# Patient Record
Sex: MALE | Race: WHITE | Employment: UNEMPLOYED | URBAN - METROPOLITAN AREA
[De-identification: names, ages, dates, MRNs, and addresses within clinical notes are randomized per-mention and may not be internally consistent; named-entity substitution may affect disease eponyms.]

---

## 2020-07-02 ENCOUNTER — HOSPITAL ENCOUNTER (OUTPATIENT)
Dept: PHYSICAL THERAPY | Age: 25
Discharge: HOME | End: 2020-07-02
Payer: COMMERCIAL

## 2020-07-02 PROCEDURE — 97016 VASOPNEUMATIC DEVICE THERAPY: CPT

## 2020-07-02 PROCEDURE — 97014 ELECTRIC STIMULATION THERAPY: CPT

## 2020-07-02 PROCEDURE — 97110 THERAPEUTIC EXERCISES: CPT

## 2020-07-02 PROCEDURE — 97161 PT EVAL LOW COMPLEX 20 MIN: CPT
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
## 2020-07-02 NOTE — PROGRESS NOTES
PHYSICAL THERAPY - DAILY TREATMENT NOTE    Patient Name: Emily Osorio        Date: 2020  : 1995   yes Patient  Verified  Visit #:     Insurance: Payor: Verena Bellamyign / Plan: [address] PT / Product Type: Commerical /      In time: 5:04 Out time: 6:03   Total Treatment Time: 59     Medicare/BCBS Time Tracking (below)   Total Timed Codes (min):  na 1:1 Treatment Time:  na     TREATMENT AREA =  Knee pain, left [M25.562]    SUBJECTIVE  Pain Level (on 0 to 10 scale):  4  / 10   Medication Changes/New allergies or changes in medical history, any new surgeries or procedures?    no  If yes, update Summary List   Subjective Functional Status/Changes:  []  No changes reported     See POC          OBJECTIVE  Modalities Rationale:     decrease edema, decrease inflammation, decrease pain and increase muscle contraction/control to improve patient's ability to ambulate  8 min [x] Estim, type/location: [facility] to R VL/VMO, 10:20, 65 pps, 5 sec w/ quad set in long sit                                     []  att     []  unatt     []  w/US     []  w/ice    []  w/heat    min []  Mechanical Traction: type/lbs                   []  pro   []  sup   []  int   []  cont    []  before manual    []  after manual    min []  Ultrasound, settings/location:      min []  Iontophoresis w/ dexamethasone, location:                                               []  take home patch       []  in clinic    min []  Ice     []  Heat    location/position:    10 min [x]  Vasopneumatic Device, press/temp: MP/LT to the L knee in long sit    min []  Other:    [x] Skin assessment post-treatment (if applicable):    [x]  intact    []  redness- no adverse reaction     []redness  adverse reaction:        10 min Therapeutic Exercise:  [x]  See flow sheet   Rationale:      increase ROM, increase strength, improve coordination, improve balance and increase proprioception to improve the patients ability to ambulate     Billed With/As: [x] TE   [] TA   [] Neuro   [] Self Care Patient Education: [x] Review HEP    [] Progressed/Changed HEP based on:   [] positioning   [] body mechanics   [] transfers   [] heat/ice application    [x] other: discussed sleeping w/ brace donned and locked to avoid any tibial rotation, advised nothing under knee when laying/sitting w/ knee extended, brace locked w/ ambulation until adequate quad control, and SLR w/ brace locked in extension     Other Objective/Functional Measures:    See POC     Post Treatment Pain Level (on 0 to 10) scale:   0  / 10     ASSESSMENT  Assessment/Changes in Function:     See POC     []  See Progress Note/Recertification   Patient will continue to benefit from skilled PT services to modify and progress therapeutic interventions, address functional mobility deficits, address ROM deficits, address strength deficits, analyze and address soft tissue restrictions, analyze and cue movement patterns, analyze and modify body mechanics/ergonomics, assess and modify postural abnormalities, address imbalance/dizziness and instruct in home and community integration to attain remaining goals.    Progress toward goals / Updated goals:    See POC     PLAN  []  Upgrade activities as tolerated yes Continue plan of care   []  Discharge due to :    []  Other:      Therapist: Christopher Hay PT    Date: 7/2/2020 Time: 5:54 PM     Future Appointments   Date Time Provider Desmond Shukla   7/7/2020  3:45 PM Julio Cesar Vallecillo PTA [facility]   7/9/2020  2:15 PM Nemesio Duncan DPT [facility]   7/13/2020  3:30 PM Nemesio Duncan DPT [facility]   7/15/2020  4:00 PM Nemesio Duncan DPT [facility]   7/20/2020  3:30 PM Nemesio Duncan DPT [facility]   7/22/2020  3:15 PM Nemesio Duncan DPT [facility]   7/27/2020  3:30 PM Nemesio Duncan DPT [facility]   7/29/2020  3:45 PM Nemesio Duncan DPT [facility]

## 2020-07-07 ENCOUNTER — HOSPITAL ENCOUNTER (OUTPATIENT)
Dept: PHYSICAL THERAPY | Age: 25
Discharge: HOME | End: 2020-07-07
Payer: COMMERCIAL

## 2020-07-07 PROCEDURE — 97140 MANUAL THERAPY 1/> REGIONS: CPT

## 2020-07-07 PROCEDURE — 97014 ELECTRIC STIMULATION THERAPY: CPT

## 2020-07-07 PROCEDURE — 97110 THERAPEUTIC EXERCISES: CPT

## 2020-07-07 NOTE — PROGRESS NOTES
PT DAILY TREATMENT NOTE     Patient Name: Sherice Nair  Date:2020  : 1995  [x]  Patient  Verified  Payor: Tiffanie Manuel / Plan: [place] [employer]  [employer]IT[employer] PT / Product Type: Commerical /    In time:3:50  Out time:4:57  Total Treatment Time (min): 67    Visit #: 2 of     Treatment Area: Knee pain, left [M25.562]    SUBJECTIVE  Pain Level IN: (0-10 scale): 1/10   Pain Level OUT: (0-10 scale) post treatment: 2/10    Any medication changes, allergies to medications, adverse drug reactions, diagnosis change, or new procedure performed?: [x] No    [] Yes (see summary sheet for update)  Subjective functional status/changes:   [] No changes reported  I am doing good, I can't wait to get rid this brace especially at night so I can sleep better       OBJECTIVE    Modalities Rationale:     decrease edema, decrease inflammation, decrease pain and increase tissue extensibility to improve patient's ability to increase strength and improve ambulation   10+10 min [x] Estim, type/location: [place] to the VMO with active quad sets  IFC with CP                                      []  att     [x]  unatt     []  w/US     []  w/ice    []  w/heat    min []  Mechanical Traction: type/lbs                   []  pro   []  sup   []  int   []  cont    []  before manual    []  after manual    min []  Ultrasound, settings/location:      min []  Iontophoresis w/ dexamethasone, location:                                               []  take home patch       []  in clinic    min []  Ice     []  Heat    location/position:     min []  Vasopneumatic Device, press/temp:     min []  Other:    [] Skin assessment post-treatment (if applicable):    []  intact    []  redness- no adverse reaction     []redness  adverse reaction:             37/32 min Therapeutic Exercise:  [x] See flow sheet : first follow up visit since initial evaluation - initiated POC per flow sheet   Added terminal extension hangs with towel roll in supine   5 min NC for warm up on bike    Rationale: increase ROM and increase strength to improve the patients ability to achieve all goals stated below     10 min Manual Therapy: Technique:      [] S/DTM []IASTM []PROM [] Passive Stretching   [] Graston:  [] GT 1  [] GT 2 [] GT 3 [] GT 4 [] GT 4 [] GT 5  [] GT 6  [] Sweep [] Fan [] [place]  [] Brush   []  Swivel []J- Stroke [] Scoop []IFraming     []Manual TPR  [] TDN (see objective; actual needle insertion time not billed)  []Jt manipulation:Gr I [] II []  III [] IV[] V[]  Treatment/Area: In sitting EOB PROM to (L) knee to increase flexion and extension     Rationale:      decrease pain, increase ROM, increase tissue extensibility and decrease trigger points to improve patient's ability to achieve all         With   [] TE   [] TA   [] neuro   [] other: Patient Education: [x] Review HEP    [] Progressed/Changed HEP based on:   [] positioning   [] body mechanics   [] transfers   [] heat/ice application    [] Graston Education: Explained the effects and benefits of Graston Technique therapy including potential for post treatment soreness and bruising. [] Other:           Objective/Functional Measures with Therapist Assessment Noted with Response to Therapy Session:     first follow up visit since initial evaluation -       initiated POC per flow sheet  Patient presents with approx - 5 with terminal extension - added extension hang in supine to achieve full extension   In supine AAROM with heelslides 105 for flexion   Patient was able to achieve an improved quad contraction with NMES  Patient did report some discomfort with mini squats       ASSESSMENT/Changes in Function:     Patient will continue to benefit from skilled PT services to modify and progress therapeutic interventions, address functional mobility deficits, address ROM deficits, address strength deficits and analyze and modify body mechanics/ergonomics to attain remaining goals.      [x]  See Plan of Care  []  See progress note/recertification  []  See Discharge Summary         Progress towards goals / Updated goals: · Short Term Goals: To be accomplished in  4  weeks:  1. Pt will be I and compliant w/ HEP for self management of sx  2. Pt will demo L knee AROM at least 5-0-125 to improve gait mechanics and stair negotiation  3. Pt will perform 2x10 SLR w/o extensor lag to improve R knee stability for ambulation and transfers  · Long Term Goals: To be accomplished in  8  weeks:  1. Pt will maintain dynamic SLS x 30\" w/o LOB to improve LE proprioception for progression to running  2. Pt will perform 2x10 lateral tap downs w/ proper form and good eccentric control to progress to running and plyometrics  3.  Improve FOTO score to >/= 75/100 to indicate improved function    PLAN  [x]  Upgrade activities as tolerated     [x]  Continue plan of care  []  Update interventions per flow sheet       []  Discharge due to:_  []  Other:_      Kathi Olson, [place] 7/7/2020  4:07 PM    Future Appointments   Date Time Provider Desmond Shukla   7/9/2020  2:15 PM Nemesio Duncan, DPT [facility]   7/13/2020  3:30 PM Nemesio Duncan, ASIFT [facility]   7/15/2020  4:00 PM Nemesio Duncan, ASIFT [facility]   7/20/2020  3:30 PM Nemesio Duncan, ASIFT [facility]   7/22/2020  3:15 PM Nemesio Duncan, ASIFT [facility]   7/27/2020  3:30 PM Nemesio Duncan, ASIFT [facility]   7/29/2020  3:45 PM Nemesio Duncan, [facility]

## 2020-07-09 ENCOUNTER — HOSPITAL ENCOUNTER (OUTPATIENT)
Dept: PHYSICAL THERAPY | Age: 25
Discharge: HOME | End: 2020-07-09
Payer: COMMERCIAL

## 2020-07-09 PROCEDURE — 97014 ELECTRIC STIMULATION THERAPY: CPT | Performed by: PHYSICAL THERAPIST

## 2020-07-09 PROCEDURE — 97112 NEUROMUSCULAR REEDUCATION: CPT | Performed by: PHYSICAL THERAPIST

## 2020-07-09 PROCEDURE — 97110 THERAPEUTIC EXERCISES: CPT | Performed by: PHYSICAL THERAPIST

## 2020-07-09 PROCEDURE — 97140 MANUAL THERAPY 1/> REGIONS: CPT | Performed by: PHYSICAL THERAPIST

## 2020-07-09 NOTE — PROGRESS NOTES
PT DAILY TREATMENT NOTE     Patient Name: Que Mendoza  Date:2020  : 1995  [x]  Patient  Verified  Payor: Rachel Mcgregor / [place]: [place] [employer]  CAPITATED PT / Product Type: Commerical /    In time:217   Out time:310  Total Treatment Time (min): 48  Visit #: 3 of     Treatment Area: Knee pain, left [M25.562]    SUBJECTIVE  Pain Level IN: (0-10 scale): 0/10   Pain Level OUT: (0-10 scale) post treatment: 0/10    Any medication changes, allergies to medications, adverse drug reactions, diagnosis change, or new procedure performed?: [x] No    [] Yes (see summary sheet for update)  Subjective functional status/changes:   [x] No changes reported        OBJECTIVE    Modalities Rationale:     decrease edema, decrease inflammation, decrease pain and increase tissue extensibility to improve patient's ability to increase strength and improve ambulation   15 min [x] Estim, type/location: IFC with ice to the L knee                                     []  att     [x]  unatt     []  w/US     [x]  w/ice    []  w/heat    min []  Mechanical Traction: type/lbs                   []  pro   []  sup   []  int   []  cont    []  before manual    []  after manual    min []  Ultrasound, settings/location:      min []  Iontophoresis w/ dexamethasone, location:                                               []  take home patch       []  in clinic    min []  Ice     []  Heat    location/position:     min []  Vasopneumatic Device, press/temp:     min []  Other:    [] Skin assessment post-treatment (if applicable):    []  intact    []  redness- no adverse reaction     []redness  adverse reaction:             18 min Therapeutic Exercise:  [x] See flow sheet :    Rationale: increase ROM and increase strength to improve the patients ability to achieve all goals stated below       10 min Neuromuscular Re-education:  []  See flow sheet : [place] estim 10/10 for 10 min with quad sets   Rationale: increase strength  to improve the patients ability to improve functional transfers      10 min Manual Therapy: Technique:      [] S/DTM []IASTM []PROM [] Passive Stretching   [] Graston:  [] GT 1  [] GT 2 [] GT 3 [] GT 4 [] GT 4 [] GT 5  [] GT 6  [] Sweep [] Fan [] [place]  [] Brush   []  Swivel []J- Stroke [] Scoop []IFraming     []Manual TPR  [] TDN (see objective; actual needle insertion time not billed)  []Jt manipulation:Gr I [] II []  III [] IV[] V[]  Treatment/Area: In sitting EOB PROM to (L) knee to increase flexion and extension     Rationale:      decrease pain, increase ROM, increase tissue extensibility and decrease trigger points to improve patient's ability to achieve all         With   [] TE   [] TA   [] neuro   [] other: Patient Education: [x] Review HEP    [] Progressed/Changed HEP based on:   [] positioning   [] body mechanics   [] transfers   [] heat/ice application    [] Graston Education: Explained the effects and benefits of Graston Technique therapy including potential for post treatment soreness and bruising. [] Other:       Objective/Functional Measures   Pt continues to have quad lag with SLR      ASSESSMENT/Changes in Function:   Pt tolerated all therex without increased pain. Continues to present with L quad weakness noted with lag with SLR. Continue to progress as tolerated. Patient will continue to benefit from skilled PT services to modify and progress therapeutic interventions, address functional mobility deficits, address ROM deficits, address strength deficits and analyze and modify body mechanics/ergonomics to attain remaining goals. Progress towards goals / Updated goals: · Short Term Goals: To be accomplished in  4  weeks:  1. Pt will be I and compliant w/ HEP for self management of sx- pt is compliant with HEP  2. Pt will demo L knee AROM at least 5-0-125 to improve gait mechanics and stair negotiation  3.  Pt will perform 2x10 SLR w/o extensor lag to improve R knee stability for ambulation and transfers  · Long Term Goals: To be accomplished in  8  weeks:  1. Pt will maintain dynamic SLS x 30\" w/o LOB to improve LE proprioception for progression to running  2. Pt will perform 2x10 lateral tap downs w/ proper form and good eccentric control to progress to running and plyometrics  3.  Improve FOTO score to >/= 75/100 to indicate improved function    PLAN  [x]  Upgrade activities as tolerated     [x]  Continue plan of care  []  Update interventions per flow sheet       []  Discharge due to:_  [x]  Other: check goals       Brooks Hinton DPT 7/9/2020  418  PM    Future Appointments   Date Time Provider Desmond Shukla   7/13/2020  3:30 PM Didi Cea, DPT [facility]   7/15/2020  4:00 PM Didi Cea, DPT [facility]   7/20/2020  3:30 PM Didi Cea, DPT [facility]   7/22/2020  3:15 PM Didi Cea, DPT [facility]   7/27/2020  3:30 PM Didi Cea, DPT [facility]   7/29/2020  3:45 PM Didi Cea, DPT [facility]

## 2020-07-13 ENCOUNTER — HOSPITAL ENCOUNTER (OUTPATIENT)
Dept: PHYSICAL THERAPY | Age: 25
Discharge: HOME | End: 2020-07-13
Payer: COMMERCIAL

## 2020-07-13 PROCEDURE — 97112 NEUROMUSCULAR REEDUCATION: CPT | Performed by: PHYSICAL THERAPIST

## 2020-07-13 PROCEDURE — 97140 MANUAL THERAPY 1/> REGIONS: CPT | Performed by: PHYSICAL THERAPIST

## 2020-07-13 PROCEDURE — 97110 THERAPEUTIC EXERCISES: CPT | Performed by: PHYSICAL THERAPIST

## 2020-07-13 PROCEDURE — 97014 ELECTRIC STIMULATION THERAPY: CPT | Performed by: PHYSICAL THERAPIST

## 2020-07-13 NOTE — PROGRESS NOTES
PT DAILY TREATMENT NOTE     Patient Name: Lorrie Gilliland  Date:2020  : 1995  [x]  Patient  Verified  Payor: Agustina Thakur / Plan: [place] [employer]  CAPITATED PT / Product Type: Commerical /    In time:325   Out time:440  Total Treatment Time (min): 75  Visit #: 4  of     Treatment Area: Knee pain, left [M25.562]    SUBJECTIVE  Pain Level IN: (0-10 scale): 0/10   Pain Level OUT: (0-10 scale) post treatment: 0/10    Any medication changes, allergies to medications, adverse drug reactions, diagnosis change, or new procedure performed?: [x] No    [] Yes (see summary sheet for update)  Subjective functional status/changes:   [] No changes reported   Pt reports compliance with wearing his brace. Pt reports compliance with HEP. When my gait improve so my back stops hurting.        OBJECTIVE    Modalities Rationale:     decrease edema, decrease inflammation, decrease pain and increase tissue extensibility to improve patient's ability to increase strength and improve ambulation   15 min [x] Estim, type/location: IFC with ice to the L knee                                     []  att     [x]  unatt     []  w/US     [x]  w/ice    []  w/heat    min []  Mechanical Traction: type/lbs                   []  pro   []  sup   []  int   []  cont    []  before manual    []  after manual    min []  Ultrasound, settings/location:      min []  Iontophoresis w/ dexamethasone, location:                                               []  take home patch       []  in clinic    min []  Ice     []  Heat    location/position:     min []  Vasopneumatic Device, press/temp:     min []  Other:    [] Skin assessment post-treatment (if applicable):    []  intact    []  redness- no adverse reaction     []redness  adverse reaction:             50 min Therapeutic Exercise:  [x] See flow sheet :    Rationale: increase ROM and increase strength to improve the patients ability to achieve all goals stated below       10 min Neuromuscular Re-education:  []  See flow sheet : [place] estim 10/10 for 10 min with quad sets   Rationale: increase strength  to improve the patients ability to improve functional transfers      10 min Manual Therapy: Technique:      [] S/DTM []IASTM []PROM [] Passive Stretching   [] Graston:  [] GT 1  [] GT 2 [] GT 3 [] GT 4 [] GT 4 [] GT 5  [] GT 6  [] Sweep [] Fan [] Jbphh  [] Brush   []  Swivel []J- Stroke [] Scoop []IFraming     []Manual TPR  [] TDN (see objective; actual needle insertion time not billed)  []Jt manipulation:Gr I [] II []  III [] IV[] V[]  Treatment/Area: In sitting EOB PROM to (L) knee to increase flexion and extension     Rationale:      decrease pain, increase ROM, increase tissue extensibility and decrease trigger points to improve patient's ability to achieve all         With   [] TE   [] TA   [] neuro   [] other: Patient Education: [x] Review HEP    [] Progressed/Changed HEP based on:   [] positioning   [] body mechanics   [] transfers   [] heat/ice application    [] Graston Education: Explained the effects and benefits of Graston Technique therapy including potential for post treatment soreness and bruising. [] Other:       Objective/Functional Measures   Pt continues to have quad lag with SLR   A/PROM of the L knee: 0-102/110 deg       ASSESSMENT/Changes in Function:   Continued quad weakness noted with Therex. Improvements in ROM of the L knee this session. Continue to progress as tolerated. Patient will continue to benefit from skilled PT services to modify and progress therapeutic interventions, address functional mobility deficits, address ROM deficits, address strength deficits and analyze and modify body mechanics/ergonomics to attain remaining goals. Progress towards goals / Updated goals: · Short Term Goals: To be accomplished in  4  weeks:  1. Pt will be I and compliant w/ HEP for self management of sx- pt is compliant with HEP 7/13/2020  2.  Pt will demo L knee AROM at least 5-0-125 to improve gait mechanics and stair negotiation progressing 7/13/2020  3. Pt will perform 2x10 SLR w/o extensor lag to improve R knee stability for ambulation and transfers  · Long Term Goals: To be accomplished in  8  weeks:  1. Pt will maintain dynamic SLS x 30\" w/o LOB to improve LE proprioception for progression to running  2. Pt will perform 2x10 lateral tap downs w/ proper form and good eccentric control to progress to running and plyometrics  3.  Improve FOTO score to >/= 75/100 to indicate improved function    PLAN  [x]  Upgrade activities as tolerated     [x]  Continue plan of care  []  Update interventions per flow sheet       []  Discharge due to:_  []  Other:   Vivian Castrejon DPT 7/13/2020  427  PM    Future Appointments   Date Time Provider Desmond Shukla   7/15/2020  4:00 PM Carmen Srivastava DPT [facility]   7/20/2020  3:30 PM Carmen Srivastava DPT [facility]   7/22/2020  3:15 PM Carmen Srivastava DPT [facility]   7/27/2020  3:30 PM Carmen Srivastava DPT [facility]   7/29/2020  3:45 PM Carmen Srivastava DPT [facility]

## 2020-07-15 ENCOUNTER — HOSPITAL ENCOUNTER (OUTPATIENT)
Dept: PHYSICAL THERAPY | Age: 25
Discharge: HOME | End: 2020-07-15
Payer: COMMERCIAL

## 2020-07-15 PROCEDURE — 97140 MANUAL THERAPY 1/> REGIONS: CPT | Performed by: PHYSICAL THERAPIST

## 2020-07-15 PROCEDURE — 97016 VASOPNEUMATIC DEVICE THERAPY: CPT | Performed by: PHYSICAL THERAPIST

## 2020-07-15 PROCEDURE — 97112 NEUROMUSCULAR REEDUCATION: CPT | Performed by: PHYSICAL THERAPIST

## 2020-07-15 PROCEDURE — 97110 THERAPEUTIC EXERCISES: CPT | Performed by: PHYSICAL THERAPIST

## 2020-07-15 NOTE — PROGRESS NOTES
PT DAILY TREATMENT NOTE     Patient Name: Amanda De Leon  Date:7/15/2020  : 1995  [x]  Patient  Verified  Payor: Rafael Benavidez / Plan: [place] [employer]  [facility] Product Type: Commerical /    In time:402   Out time:519  Total Treatment Time (min): 68  Visit #: 5 of     Treatment Area: Knee pain, left [M25.562]    SUBJECTIVE  Pain Level IN: (0-10 scale): 0/10   Pain Level OUT: (0-10 scale) post treatment: 0/10    Any medication changes, allergies to medications, adverse drug reactions, diagnosis change, or new procedure performed?: [x] No    [] Yes (see summary sheet for update)  Subjective functional status/changes:   [x] No changes reported        OBJECTIVE    Modalities Rationale:     decrease edema, decrease inflammation, decrease pain and increase tissue extensibility to improve patient's ability to increase strength and improve ambulation   H min [x] Estim, type/location: IFC with ice to the L knee                                     []  att     [x]  unatt     []  w/US     [x]  w/ice    []  w/heat    min []  Mechanical Traction: type/lbs                   []  pro   []  sup   []  int   []  cont    []  before manual    []  after manual    min []  Ultrasound, settings/location:      min []  Iontophoresis w/ dexamethasone, location:                                               []  take home patch       []  in clinic    min []  Ice     []  Heat    location/position:    15 min [x]  Vasopneumatic Device, press/temp: MP/LT    min []  Other:    [] Skin assessment post-treatment (if applicable):    []  intact    []  redness- no adverse reaction     []redness  adverse reaction:             42 min Therapeutic Exercise:  [x] See flow sheet :    Rationale: increase ROM and increase strength to improve the patients ability to achieve all goals stated below       10 min Neuromuscular Re-education:  []  See flow sheet : [place] estim 5/5 for 10 min with quad sets   Rationale: increase strength  to improve the patients ability to improve functional transfers      10 min Manual Therapy: Technique:      [] S/DTM []IASTM [x]PROM [] Passive Stretching   [] Graston:  [] GT 1  [] GT 2 [] GT 3 [] GT 4 [] GT 4 [] GT 5  [] GT 6  [] Sweep [] Fan [] Whiting  [] Brush   []  Swivel []J- Stroke [] Scoop []IFraming     []Manual TPR  [] TDN (see objective; actual needle insertion time not billed)  []Jt manipulation:Gr I [] II []  III [] IV[] V[]  Treatment/Area:  L knee    Rationale:      decrease pain, increase ROM, increase tissue extensibility and decrease trigger points to improve patient's ability to achieve all         With   [] TE   [] TA   [] neuro   [] other: Patient Education: [x] Review HEP    [] Progressed/Changed HEP based on:   [] positioning   [] body mechanics   [] transfers   [] heat/ice application    [] Graston Education: Explained the effects and benefits of Graston Technique therapy including potential for post treatment soreness and bruising. [] Other:       Objective/Functional Measures         ASSESSMENT/Changes in Function:   Improved tolerance to therex with reduced quivering with quad strengthening. Continue to progress as tolerated per MD protocol. Patient will continue to benefit from skilled PT services to modify and progress therapeutic interventions, address functional mobility deficits, address ROM deficits, address strength deficits and analyze and modify body mechanics/ergonomics to attain remaining goals. Progress towards goals / Updated goals: · Short Term Goals: To be accomplished in  4  weeks:  1. Pt will be I and compliant w/ HEP for self management of sx- pt is compliant with HEP 7/13/2020  2. Pt will demo L knee AROM at least 5-0-125 to improve gait mechanics and stair negotiation progressing 7/13/2020  3. Pt will perform 2x10 SLR w/o extensor lag to improve R knee stability for ambulation and transfers  · Long Term Goals: To be accomplished in  8  weeks:  1.  Pt will maintain dynamic SLS x 30\" w/o LOB to improve LE proprioception for progression to running  2. Pt will perform 2x10 lateral tap downs w/ proper form and good eccentric control to progress to running and plyometrics  3.  Improve FOTO score to >/= 75/100 to indicate improved function    PLAN  [x]  Upgrade activities as tolerated     [x]  Continue plan of care  []  Update interventions per flow sheet       []  Discharge due to:_  [x]  Other: check goals   Lorena Richardson DPT 7/15/2020  510   PM    Future Appointments   Date Time Provider Desmond Shukla   7/20/2020  3:30 PM Deepa Trent, [facility]   7/22/2020  3:15 PM Deepa Trent, [facility]   7/27/2020  3:30 PM Deepa Trent, [facility]   7/29/2020  3:45 PM Deepa Trent, [facility]   8/3/2020  3:45 PM Deepa Trent, [facility]   8/5/2020  1:45 PM Deepa Trent, [facility]   8/10/2020  3:45 PM Deepa Trent, [facility]   8/12/2020  2:30 PM Deepa Trent, [facility]

## 2020-07-20 ENCOUNTER — HOSPITAL ENCOUNTER (OUTPATIENT)
Dept: PHYSICAL THERAPY | Age: 25
Discharge: HOME | End: 2020-07-20
Payer: COMMERCIAL

## 2020-07-20 PROCEDURE — 97112 NEUROMUSCULAR REEDUCATION: CPT | Performed by: PHYSICAL THERAPIST

## 2020-07-20 PROCEDURE — 97110 THERAPEUTIC EXERCISES: CPT | Performed by: PHYSICAL THERAPIST

## 2020-07-20 PROCEDURE — 97016 VASOPNEUMATIC DEVICE THERAPY: CPT | Performed by: PHYSICAL THERAPIST

## 2020-07-20 NOTE — PROGRESS NOTES
PT DAILY TREATMENT NOTE     Patient Name: Abran Mckeon  Date:2020  : 1995  [x]  Patient  Verified  Payor: Jose Manuel Acosta / Plan: [place] [employer]  CAPITATED PT / Product Type: Commerical /    In time:3:30  Out time:4:40  Total Treatment Time (min): 70  Visit #: 6 of 16    Treatment Area: Knee pain, left [M25.562]    SUBJECTIVE  Pain Level (0-10 scale): 0/10  Any medication changes, allergies to medications, adverse drug reactions, diagnosis change, or new procedure performed?: [x] No    [] Yes (see summary sheet for update)  Subjective functional status/changes:   [] No changes reported  Had an increase in pain when going back to work but is getting used to it. OBJECTIVE    Modality rationale: decrease pain, increase tissue extensibility and increase muscle contraction/control to improve the patients ability to ambulate and complete ADL's without pain.     Min Type Additional Details    [] Estim:  []Unatt       []IFC  []Premod                        []Other:  []w/ice   []w/heat  Position:  Location:    [] Estim: []Att    []TENS instruct  []NMES                    []Other:  []w/US   []w/ice   []w/heat  Position:  Location:    []  Traction: [] Cervical       []Lumbar                       [] Prone          []Supine                       []Intermittent   []Continuous Lbs:  [] before manual  [] after manual    []  Ultrasound: []Continuous   [] Pulsed                           []1MHz   []3MHz W/cm2:  Location:    []  Iontophoresis with dexamethasone         Location: [] Take home patch   [] In clinic    []  Ice     []  heat  []  Ice massage  []  Laser   []  Anodyne Position:  Location:    []  Laser with stim  []  Other:  Position:  Location:   15 [x]  Vasopneumatic Device Pressure:       [] lo [x] med [] hi   Temperature: [x] lo [] med [] hi   [] Skin assessment post-treatment:  []intact []redness- no adverse reaction    []redness  adverse reaction:     25 min Therapeutic Exercise:  [x] See flow sheet : Rationale: increase strength to improve the patients ability to ambulate and negotiate stairs. 30 min Neuromuscular Re-education:  [x]  See flow sheet :[place] stim with quad co contractions 5:5 to L quad, SLS weight shifts, Rhomberg Progression on the floor/foam L EO In/Bun/GT/Tandem, Rebounder SLS on L with foam   Rationale: increase strength and improve coordination  to improve the patients ability to properly contract L quad and knee stabilizers. With   [] TE   [] TA   [] neuro   [] other: Patient Education: [x] Review HEP    [] Progressed/Changed HEP based on:   [] positioning   [] body mechanics   [] transfers   [] heat/ice application    [] Graston Education: Explained the effects and benefits of Graston Technique therapy including potential for post treatment soreness and bruising. [] Other:      Other Objective/Functional Measures:   L Knee AROM: Flex (108), Ext (-5)   L Knee PROM: Flex (110), Ext (0) achieved after hanging extension  Decreased L quad lag during supine SLR  Balanced in tandem 30\" w/o UE assist on floor and foam L foot  Balanced in SLS 30\" w/o UE assist on floor and foam L foot    Pain Level (0-10 scale) post treatment: 0/10    ASSESSMENT/Changes in Function:   Patient tolerated progression of balance exercises noted in the exercises on the flow sheet and should be progressed upon next visit. Patients L knee ROM is improving alongside treatment. Observed decrease in L Quad lag during Flexion SLR. Continue POC as tolerated per MD protocol. Patient will continue to benefit from skilled PT services to address functional mobility deficits, address strength deficits and analyze and cue movement patterns to attain remaining goals. [x]  See Plan of Care  []  See progress note/recertification  []  See Discharge Summary         Progress towards goals / Updated goals: · Short Term Goals: To be accomplished in  4  weeks:  1.  Pt will be I and compliant w/ HEP for self management of sx- pt is compliant with HEP 7/13/2020  2. Pt will demo L knee AROM at least 5-0-125 to improve gait mechanics and stair negotiation - progressing (0-108 deg) 7/20/2020  3. Pt will perform 2x10 SLR w/o extensor lag to improve R knee stability for ambulation and transfers. Progressing (1x10) SLR with decreased lag 7/20/2020  · Long Term Goals: To be accomplished in  8  weeks:  1. Pt will maintain dynamic SLS x 30\" w/o LOB to improve LE proprioception for progression to running. Progressing Added Ball Toss Rebounder  7/20/2020  2. Pt will perform 2x10 lateral tap downs w/ proper form and good eccentric control to progress to running and plyometrics  3.  Improve FOTO score to >/= 75/100 to indicate improved function      PLAN  [x]  Upgrade activities as tolerated     [x]  Continue plan of care  []  Update interventions per flow sheet       []  Discharge due to:_  []  Other:_     Sandrita Watkins 7/20/2020  3:35 PM    Future Appointments   Date Time Provider Desmond Shukla   7/22/2020  3:15 PM Richardine Saltness, DPT [facility]   7/27/2020  3:30 PM Richardine Saltness, DPT [facility]   7/29/2020  3:45 PM Richardine Saltness, DPT [facility]   8/3/2020  3:45 PM Richardine Saltness, DPT [facility]   8/5/2020  1:45 PM Richardine Saltness, DPT [facility]   8/10/2020  3:45 PM Richardine Saltness, DPT [facility]   8/12/2020  2:30 PM Richardine Saltness, DPT [facility]

## 2020-07-22 ENCOUNTER — HOSPITAL ENCOUNTER (OUTPATIENT)
Dept: PHYSICAL THERAPY | Age: 25
Discharge: HOME | End: 2020-07-22
Payer: COMMERCIAL

## 2020-07-22 PROCEDURE — 97110 THERAPEUTIC EXERCISES: CPT | Performed by: PHYSICAL THERAPIST

## 2020-07-22 PROCEDURE — 97112 NEUROMUSCULAR REEDUCATION: CPT | Performed by: PHYSICAL THERAPIST

## 2020-07-22 PROCEDURE — 97016 VASOPNEUMATIC DEVICE THERAPY: CPT | Performed by: PHYSICAL THERAPIST

## 2020-07-22 NOTE — PROGRESS NOTES
PT DAILY TREATMENT NOTE     Patient Name: Abran Mckeon  Date:2020  : 1995  [x]  Patient  Verified  Payor: Jose Manuel Acosta / Plan: [place] [employer]  CAPITATED PT / Product Type: Commerical /    In time: 3:15  Out time:4:34  Total Treatment Time (min): 79  Visit #: 7 of -12    Treatment Area: Knee pain, left [M25.562]    SUBJECTIVE  Pain Level (0-10 scale): 0/10  Any medication changes, allergies to medications, adverse drug reactions, diagnosis change, or new procedure performed?: [x] No    [] Yes (see summary sheet for update)  Subjective functional status/changes:   [] No changes reported  \"Everything is feeling good, I just want to get out of this brace already. Haven't had any pain since last visit. \"    OBJECTIVE    Modality rationale: decrease inflammation, decrease pain and increase muscle contraction/control to improve the patients ability to contract L quadriceps and ambulate with less pain.    Min Type Additional Details    - Estim:  -Unatt       -IFC  -Premod                        -Other:  -w/ice   -w/heat  Position:  Location:    - Estim: -Att    -TENS instruct  -NMES                    -Other:  -w/US   -w/ice   -w/heat  Position:  Location:    -  Traction: - Cervical       -Lumbar                       - Prone          -Supine                       -Intermittent   -Continuous Lbs:  - before manual  - after manual    -  Ultrasound: -Continuous   - Pulsed                           -1MHz   -3MHz W/cm2:  Location:    -  Iontophoresis with dexamethasone         Location: - Take home patch   - In clinic    -  Ice     -  heat  -  Ice massage  -  Laser   -  Anodyne Position:  Location:    -  Laser with stim  -  Other:  Position:  Location:   15 -  Vasopneumatic Device Pressure:       - lo * med - hi   Temperature: * lo - med - hi       50 min Therapeutic Exercise:  [x] See flow sheet : Progressed PRE's per flow sheet   Rationale: increase strength and improve coordination to improve the patients ability to complete ADL's and recreational activities. 10 min Neuromuscular Re-education:  [x]  See flow sheet : [place] Stim at 10/10 w/ quad set. Rationale: increase strength and improve coordination  to improve the patients ability to contract L quadriceps for navigating stairs and resuming hobbies/job. 4 min Manual Therapy: Technique:      [] S/DTM []IASTM []PROM [] Passive Stretching   [] Graston:  [] GT 1  [] GT 2 [] GT 3 [] GT 4 [] GT 4 [] GT 5  [] GT 6  [] Sweep [] Fan [] Arvin  [] Brush   []  Swivel []J- Stroke [] Scoop []IFraming     []Manual TPR  [] TDN (see objective; actual needle insertion time not billed)  [x]Jt manipulation:Gr I [] II []  III [x] IV[x] V[]  Treatment/Area:  L Patellar mobilizations in all directions. Rationale:      decrease pain, increase ROM, increase tissue extensibility and decrease trigger points to improve patient's ability to utilize L quad without pain. With   [] TE   [] TA   [] neuro   [] other: Patient Education: [x] Review HEP    [] Progressed/Changed HEP based on:   [] positioning   [] body mechanics   [] transfers   [] heat/ice application    [] Graston Education: Explained the effects and benefits of Graston Technique therapy including potential for post treatment soreness and bruising. [] Other:      Other Objective/Functional Measures:   R AROM: Flex: 115, Ext: 5 After Manual therapy  R PROM: Flex: 115, Ext: 8 After Manual therapy  R patellar hypomobility in all directions. Patient completed TE without fail or complaint of pain. Patient exhibited quad lag and quivering during supine anterior SLR w/ brace. Pain Level (0-10 scale) post treatment: 0/10     ASSESSMENT/Changes in Function:   Patient presented without complaint of pain and completed new therapeutic exercises without issue. Patient showed improved mobility in L knee in comparison to previous visits. Patient still showed quad lag/quivering during anterior SLR and quiver.  Difficulty of exercises should be progressed (ie, resistance and weights). Continue with current POC in accordance to MD Protocol. Patient will continue to benefit from skilled PT services to modify and progress therapeutic interventions, address ROM deficits and address strength deficits to attain remaining goals. [x]  See Plan of Care  []  See progress note/recertification  []  See Discharge Summary         Progress towards goals / Updated goals: · Short Term Goals: To be accomplished in  4  weeks:  1. Pt will be I and compliant w/ HEP for self management of sx- pt is compliant with HEP 7/13/2020  2. Pt will demo L knee AROM at least 5-0-125 to improve gait mechanics and stair negotiation - progressing (0-108 deg) 7/20/2020  3. Pt will perform 2x10 SLR w/o extensor lag to improve R knee stability for ambulation and transfers. Progressing (1x10) SLR with decreased lag 7/20/2020  · Long Term Goals: To be accomplished in  8  weeks:  1. Pt will maintain dynamic SLS x 30\" w/o LOB to improve LE proprioception for progression to running. Progressing Added Ball Toss Rebounder  7/20/2020  2. Pt will perform 2x10 lateral tap downs w/ proper form and good eccentric control to progress to running and plyometrics  3.  Improve FOTO score to >/= 75/100 to indicate improved function    PLAN  [x]  Upgrade activities as tolerated     [x]  Continue plan of care  []  Update interventions per flow sheet       []  Discharge due to:_  [x]  Other: check Goals       Perez [place] SPT 7/22/2020  5:53 PM    Future Appointments   Date Time Provider Desmond Shukla   7/27/2020  3:30 PM Natalie Cannon DPT [facility]   7/29/2020  3:45 PM Natalie Cannon DPT [facility]   8/3/2020  3:45 PM Natalie Cannon DPT [facility]   8/5/2020  2:00 PM [address] [facility]   8/10/2020  3:45 PM Natalie Cannon DPT [facility]   8/12/2020  2:30 PM Natalie Cannon DPT [facility]

## 2020-07-27 ENCOUNTER — HOSPITAL ENCOUNTER (OUTPATIENT)
Dept: PHYSICAL THERAPY | Age: 25
Discharge: HOME | End: 2020-07-27
Payer: COMMERCIAL

## 2020-07-27 PROCEDURE — 97140 MANUAL THERAPY 1/> REGIONS: CPT | Performed by: PHYSICAL THERAPIST

## 2020-07-27 PROCEDURE — 97112 NEUROMUSCULAR REEDUCATION: CPT | Performed by: PHYSICAL THERAPIST

## 2020-07-27 PROCEDURE — 97016 VASOPNEUMATIC DEVICE THERAPY: CPT | Performed by: PHYSICAL THERAPIST

## 2020-07-27 PROCEDURE — 97110 THERAPEUTIC EXERCISES: CPT | Performed by: PHYSICAL THERAPIST

## 2020-07-27 NOTE — PROGRESS NOTES
PT DAILY TREATMENT NOTE     Patient Name: Josephina Ahumada  Date:2020  : 1995  [x]  Patient  Verified  Payor: Marissa Pretty / Plan: [address] [place] [address] Rd PT / Product Type: Commerical /    In time:3:20  Out time: 4:30  Total Treatment Time (min): 70  Visit #: 8 of     Treatment Area: Knee pain, left [M25.562]    SUBJECTIVE  Pain Level (0-10 scale): 0/10  Any medication changes, allergies to medications, adverse drug reactions, diagnosis change, or new procedure performed?: [x] No    [] Yes (see summary sheet for update)  Subjective functional status/changes:   [] No changes reported  I went to the doctor. He dc'd my brace but I can wear this one I had to help the stability until it doesn't hurt anymore. I want to stop wearing it asap though. OBJECTIVE    Modality rationale: decrease inflammation and decrease pain to improve the patients ability to walking without pain.    Min Type Additional Details    [] Estim:  []Unatt       []IFC  []Premod                        []Other:  []w/ice   []w/heat  Position:  Location:    [] Estim: []Att    []TENS instruct  []NMES                    []Other:  []w/US   []w/ice   []w/heat  Position:  Location:    []  Traction: [] Cervical       []Lumbar                       [] Prone          []Supine                       []Intermittent   []Continuous Lbs:  [] before manual  [] after manual    []  Ultrasound: []Continuous   [] Pulsed                           []1MHz   []3MHz W/cm2:  Location:    []  Iontophoresis with dexamethasone         Location: [] Take home patch   [] In clinic    []  Ice     []  heat  []  Ice massage  []  Laser   []  Anodyne Position:  Location:    []  Laser with stim  []  Other:  Position:  Location:   15 [x]  Vasopneumatic Device Pressure:       [] lo [x] med [] hi   Temperature: [x] lo [] med [] hi   [] Skin assessment post-treatment:  []intact []redness- no adverse reaction    []redness  adverse reaction:     32 min Therapeutic Exercise:  [] See flow sheet :   Rationale: increase strength to improve the patients ability to ambulate and complete ADL's. 15 min Neuromuscular Re-education:  []  See flow sheet : 10min 10/10 russian stim on L quad with quad set, supine SLR 4 way with 2# weight verbal cues to contract quads, quad sets. Rationale: increase strength and improve coordination  to improve the patients ability to use proper gait mechanics and negotiate stairs without pain. 8 min Manual Therapy: Technique:      [x] S/DTM []IASTM [x]PROM [x] Passive Stretching   [] Graston:  [] GT 1  [] GT 2 [] GT 3 [] GT 4 [] GT 4 [] GT 5  [] GT 6  [] Sweep [] Fan [] [place]  [] Brush   []  Swivel []J- Stroke [] Scoop []IFraming     []Manual TPR  [] TDN (see objective; actual needle insertion time not billed)  [x]Jt manipulation:Gr I [] II []  III [x] IV[x] V[]  Treatment/Area:  L patellar mobilizations in all directions, L calf stretching and STM, left ITB, Quad, and Anterior Tibialis STM   Rationale:      decrease pain, increase ROM, increase tissue extensibility and decrease trigger points to improve patient's ability to walking without pain. With   [] TE   [] TA   [] neuro   [] other: Patient Education: [x] Review HEP    [] Progressed/Changed HEP based on:   [] positioning   [] body mechanics   [] transfers   [] heat/ice application    [] Graston Education: Explained the effects and benefits of Graston Technique therapy including potential for post treatment soreness and bruising. [] Other:      Other Objective/Functional Measures:   L Knee AROM Flex (115deg), Ext (5deg) before manual  Palpable L calf stiffness treated with STM   Progressed step ups to 8\" step fwd/lat. Pain Level (0-10 scale) post treatment: 0/10    ASSESSMENT/Changes in Function:   Patient came in without ROM brace this visit. He tolerated exercises well without complaint of pain. Progressed patient to 8\" step indicating improved L quad strength and activation. Patient had stiffness of L knee limiting ROM treated with PROM of L knee and STM to L calf,ITB,Quad, and anterior Tibialis. Continue with POC per MD protocol. Patient will continue to benefit from skilled PT services to address functional mobility deficits, address ROM deficits and address strength deficits to attain remaining goals. Progress towards goals / Updated goals: · Short Term Goals: To be accomplished in  4  weeks:  1. Pt will be I and compliant w/ HEP for self management of sx- pt is compliant with HEP 7/13/2020  2. Pt will demo L knee AROM at least 5-0-125 to improve gait mechanics and stair negotiation - progressing (5-0-115 deg) 7/27/2020  3. Pt will perform 2x10 SLR w/o extensor lag to improve R knee stability for ambulation and transfers. Progressing (1x10) SLR with decreased lag 7/20/2020  · Long Term Goals: To be accomplished in  8  weeks:  1. Pt will maintain dynamic SLS x 30\" w/o LOB to improve LE proprioception for progression to running. Progressing Added Ball Toss Rebounder  7/20/2020  2. Pt will perform 2x10 lateral tap downs w/ proper form and good eccentric control to progress to running and plyometrics  3.  Improve FOTO score to >/= 75/100 to indicate improved function    PLAN  [x]  Upgrade activities as tolerated     [x]  Continue plan of care  []  Update interventions per flow sheet       []  Discharge due to:_  [x]  Other: check goals  Arian Gambino SPT 7/27/2020  3:49 PM    Future Appointments   Date Time Provider Desmond Shukla   7/31/2020 10:00 AM Heather Mojica DPT [facility]   8/3/2020  3:45 PM Heather Mojica DPT [facility]   8/5/2020  2:00 PM [address] [facility]   8/10/2020  3:45 PM Heather Mojica DPT [facility]   8/12/2020  2:30 PM Heather Mojica DPT [facility]

## 2020-07-29 ENCOUNTER — APPOINTMENT (OUTPATIENT)
Dept: PHYSICAL THERAPY | Age: 25
End: 2020-07-29
Payer: COMMERCIAL

## 2020-07-31 ENCOUNTER — HOSPITAL ENCOUNTER (OUTPATIENT)
Dept: PHYSICAL THERAPY | Age: 25
Discharge: HOME | End: 2020-07-31
Payer: COMMERCIAL

## 2020-07-31 PROCEDURE — 97016 VASOPNEUMATIC DEVICE THERAPY: CPT | Performed by: PHYSICAL THERAPIST

## 2020-07-31 PROCEDURE — 97110 THERAPEUTIC EXERCISES: CPT | Performed by: PHYSICAL THERAPIST

## 2020-07-31 PROCEDURE — 97140 MANUAL THERAPY 1/> REGIONS: CPT | Performed by: PHYSICAL THERAPIST

## 2020-07-31 NOTE — PROGRESS NOTES
PT DAILY TREATMENT NOTE     Patient Name: Saira Sheriff  Date:2020  : 1995  [x]  Patient  Verified  Payor: Cash Vasquez / Plan: [facility] [employer]  CAPITATED PT / Product Type: Commerical /    In time: 10:01  Out time:11:16  Total Treatment Time (min): 75  Visit #: 9 of     Treatment Area: Knee pain, left [M25.562]    SUBJECTIVE  Pain Level (0-10 scale):  0/10  Any medication changes, allergies to medications, adverse drug reactions, diagnosis change, or new procedure performed?: [x] No    [] Yes (see summary sheet for update)  Subjective functional status/changes:   [] No changes reported  My L knee is feeling fine. My dog slept on it last night so it was extended for a long time but it doesn't hurt. OBJECTIVE    Modality rationale: decrease pain to improve the patients ability to weight bear on L LE with less pain.    Min Type Additional Details    [] Estim:  []Unatt       []IFC  []Premod                        []Other:  []w/ice   []w/heat  Position:  Location:    [] Estim: []Att    []TENS instruct  []NMES                    []Other:  []w/US   []w/ice   []w/heat  Position:  Location:    []  Traction: [] Cervical       []Lumbar                       [] Prone          []Supine                       []Intermittent   []Continuous Lbs:  [] before manual  [] after manual    []  Ultrasound: []Continuous   [] Pulsed                           []1MHz   []3MHz W/cm2:  Location:    []  Iontophoresis with dexamethasone         Location: [] Take home patch   [] In clinic    []  Ice     []  heat  []  Ice massage  []  Laser   []  Anodyne Position:  Location:    []  Laser with stim  []  Other:  Position:  Location:   15 [x]  Vasopneumatic Device Pressure:       [] lo [x] med [] hi   Temperature: [x] lo [] med [] hi   [] Skin assessment post-treatment:  []intact []redness- no adverse reaction    []redness  adverse reaction:       48 min Therapeutic Exercise:  [] See flow sheet : updated HEP   Rationale: increase ROM and increase strength to improve the patients ability to use L LE with less pain. 12 min Manual Therapy: Technique:      [x] S/DTM []IASTM [x]PROM [x] Passive Stretching   [] Graston:  [] GT 1  [] GT 2 [] GT 3 [] GT 4 [] GT 4 [] GT 5  [] GT 6  [] Sweep [] Fan [] [place]  [] Brush   []  Swivel []J- Stroke [] Scoop []IFraming     []Manual TPR  [] TDN (see objective; actual needle insertion time not billed)  []Jt manipulation:Gr I [] II []  III [] IV[] V[]  Treatment/Area:  STM of L quad,hamstrings, adductors, IT band. Passive stretching of L quad. PROM L knee flexion/extension. Rationale:      decrease pain, increase ROM, increase tissue extensibility and decrease trigger points to improve patient's ability to move through available L knee ROM with less pain. With   [x] TE   [] TA   [] neuro   [] other: Patient Education: [x] Review HEP    [] Progressed/Changed HEP based on:   [] positioning   [] body mechanics   [] transfers   [] heat/ice application    [] Graston Education: Explained the effects and benefits of Graston Technique therapy including potential for post treatment soreness and bruising. [] Other:      Other Objective/Functional Measures:     Measurements taken this visit:     L knee PROM: Flexion (123 deg) Extension (3 deg) (after manual)  L knee AROM: Flexion (110 deg) Extension (1 deg) (after manual)    Patient was able to complete 5 consecutive SLR flexion without quad lag    Pain Level (0-10 scale) post treatment: 0/10    ASSESSMENT/Changes in Function:   Patient presented without L knee brace/sleeve. Patient L knee PROM was improved from previous visits ( Flexion was 115 and is now 123 after manual, Extension was -5 and is now 3 after manual). Noted improvement in L quad contraction/strength demonstrated by patients ability to complete 5 consecutive SLR's without quad lag. Held [place] estim to assess L quad strength/activation. Continue PT POC per md protocol.      Patient will continue to benefit from skilled PT services to modify and progress therapeutic interventions, address functional mobility deficits, address ROM deficits, address strength deficits and analyze and address soft tissue restrictions to attain remaining goals. Progress towards goals / Updated goals: · Short Term Goals: To be accomplished in  4  weeks:  1. Pt will be I and compliant w/ HEP for self management of sx- pt is compliant with HEP 7/13/2020  2. Pt will demo L knee AROM at least 5-0-125 to improve gait mechanics and stair negotiation - progressing (0-108 deg) 7/20/2020  3. Pt will perform 2x10 SLR w/o extensor lag to improve R knee stability for ambulation and transfers. Progressing (1x10) SLR with decreased lag 7/20/2020  · Long Term Goals: To be accomplished in  8  weeks:  1. Pt will maintain dynamic SLS x 30\" w/o LOB to improve LE proprioception for progression to running. Progressing Added Ball Toss Rebounder  7/20/2020  2. Pt will perform 2x10 lateral tap downs w/ proper form and good eccentric control to progress to running and plyometrics  3.  Improve FOTO score to >/= 75/100 to indicate improved function    PLAN  [x]  Upgrade activities as tolerated     [x]  Continue plan of care  []  Update interventions per flow sheet       []  Discharge due to:_  [x]  Other: check goals    Henry Began SPT 7/31/2020  11:02 AM    Future Appointments   Date Time Provider Desmond Shukla   8/3/2020  3:45 PM Margo Roller, DPT [facility]   8/5/2020  2:00 PM [address] [place] [address] [facility]   8/10/2020  3:45 PM Margo Roller, DPT [facility]   8/12/2020  2:30 PM [facility] [place] [facility]   8/17/2020  3:00 PM Tessie Giraldo PT [facility]   8/19/2020  3:00 PM [facility] [place] [facility]   8/24/2020  3:45 PM Margo Roller, DPT [facility]   8/26/2020  3:15 PM Margo Roller, DPT [facility]   8/31/2020  3:00 PM Margo Roller, DPT [facility]

## 2020-08-03 ENCOUNTER — HOSPITAL ENCOUNTER (OUTPATIENT)
Dept: PHYSICAL THERAPY | Age: 25
Discharge: HOME | End: 2020-08-03
Payer: COMMERCIAL

## 2020-08-03 PROCEDURE — 97110 THERAPEUTIC EXERCISES: CPT | Performed by: PHYSICAL THERAPIST

## 2020-08-03 PROCEDURE — 97016 VASOPNEUMATIC DEVICE THERAPY: CPT | Performed by: PHYSICAL THERAPIST

## 2020-08-03 PROCEDURE — 97112 NEUROMUSCULAR REEDUCATION: CPT | Performed by: PHYSICAL THERAPIST

## 2020-08-03 NOTE — PROGRESS NOTES
[address] PHYSICAL THERAPY AT [facility] [address] [facility] [address] [facility] [address], [place], [place]  Phone [phone]  Fax [phone]  PROGRESS NOTE  Patient Name: Antionette Maki : 1995   Treatment/Medical Diagnosis: Knee pain, left [M25.562]   Referral Source: Alec Hitchcock MD     Date of Initial Visit: 2020 Attended Visits: 10 Missed Visits: 0     SUMMARY OF TREATMENT  Pt seen for L ACL repair on 2020 for 10 sessions. Therapy has closely followed MD protocol to increase strength of the L knee to return to PLOF. CURRENT STATUS  Pt reports 50% improvement since SOC in the L knee. He reports improvements in all aspects including endurance, strength, and mobility. Functional limitations include running and jumping activities. Upon reassessment, pt presents with improved L knee AROM/strength leading to improvements in functional mobility and endurance. He continues to lack L quad strength limiting tolerance to descending stairs with eccentric control. Pt is progressing per MD protocol. Pt would benefit from continued strengthening to the L quad to improve stairs and return to running when cleared by MD. Patient will continue to benefit from skilled PT services to address functional mobility deficits, address ROM deficits and address strength deficits to attain remaining goals. Other Objective/Functional Measures:   FOTO increased to 62/100 from 32/100 at evaluation  AROM of the L knee 5 to 0 to 115 deg  Noted quad lag on the L with SLR-continued [place] estim with progression to lakisha    Clinical Exam Findings: at eval on 2020  OBSERVATION: Pt presents ambulating w/ brace donned and locked in extension. Dec WB through the L LE. Surgical incision is closed and healing well w/ no signs of infection. +3 cm swelling mid patella, +1 2\" below, -6 cm girth mid quad, -1 cm girth mid calf. ROM: L knee AROM 0-87 ( R knee AROM 10-0-139).    STRENGTH: Poor quad set L, unable to perform SLR w/o assistance. (R knee/hip MMT grossly 4+ to 5/5 throughout). PALPATION: TTP to mid RF w/ significant TrP noted. TTP to L calf. Dec inf patella mobility. SPECIAL TEST: (-)aric's sign. FOTO 32/100.         Goal/Measure of Progress Goal Met? · Short Term Goals: To be accomplished in  4  weeks:  1. Pt will be I and compliant w/ HEP for self management of sx- pt is compliant with HEP 7/13/2020  2. Pt will demo L knee AROM at least 5-0-125 to improve gait mechanics and stair negotiation - progressing (5-0-115 deg) 8/3/2020  3. Pt will perform 2x10 SLR w/o extensor lag to improve R knee stability for ambulation and transfers. Progressing (1x10) SLR with decreased lag 8/3/2020  · Long Term Goals: To be accomplished in  8  weeks:  1. Pt will maintain dynamic SLS x 30\" w/o LOB to improve LE proprioception for progression to running. Met SLS 30 sec  2. Pt will perform 2x10 lateral tap downs w/ proper form and good eccentric control to progress to running and plyometrics NT 8/3/2020  3. Improve FOTO score to >/= 75/100 to indicate improved function progressing 62/100 8/3/2020    New Goals to be achieved in __4__  weeks:  1. Pt will demo L knee AROM at least 5-0-125 to improve gait mechanics and stair negotiation   2. Pt will perform 2x10 lateral tap downs w/ proper form and good eccentric control to progress to running and plyometrics  3. Improve FOTO score to >/= 75/100 to indicate improved function. RECOMMENDATIONS  Continue 2-3x/week for 4 weeks to progress towards long-term goals. If you have any questions/comments please contact us directly at [phone]. Thank you for allowing us to assist in the care of your patient. Therapist Signature: Jalen Fuentes DPT Date: 8/3/2020     Time: 5:18 PM   NOTE TO PHYSICIAN:  PLEASE COMPLETE THE ORDERS BELOW AND FAX TO   [facility] at [facility] [place]: [phone].   If you are unable to process this request in 24 hours please contact our office: [phone].    ___ I have read the above report and request that my patient continue as recommended.   ___ I have read the above report and request that my patient continue therapy with the following changes/special instructions:_________________________________________________________   ___ I have read the above report and request that my patient be discharged from therapy.      Physician Signature:        Date:       Time:

## 2020-08-03 NOTE — PROGRESS NOTES
PT DAILY TREATMENT NOTE     Patient Name: Saul Rose  Date:8/3/2020  : 1995  [x]  Patient  Verified  Payor: Kevyn Artist / Plan: [facility] [employer]  CAPITATED PT / Product Type: Commerical /    In time:343  Out time: 500  Total Treatment Time (min): 77  Visit #: 10 of     Treatment Area: Knee pain, left [M25.562]    SUBJECTIVE  Pain Level (0-10 scale): 0/10  Any medication changes, allergies to medications, adverse drug reactions, diagnosis change, or new procedure performed?: [x] No    [] Yes (see summary sheet for update)  Subjective functional status/changes:   [] No changes reported  Pt reports 50% improvement since SOC in the L knee. He reports improvements in all aspects including endurance, strength, and mobility. Functional limitations include running and jumping activities. OBJECTIVE    Modality rationale: decrease inflammation and decrease pain to improve the patients ability to walking without pain.    Min Type Additional Details    [] Estim:  []Unatt       []IFC  []Premod                        []Other:  []w/ice   []w/heat  Position:  Location:    [] Estim: []Att    []TENS instruct  []NMES                    []Other:  []w/US   []w/ice   []w/heat  Position:  Location:    []  Traction: [] Cervical       []Lumbar                       [] Prone          []Supine                       []Intermittent   []Continuous Lbs:  [] before manual  [] after manual    []  Ultrasound: []Continuous   [] Pulsed                           []1MHz   []3MHz W/cm2:  Location:    []  Iontophoresis with dexamethasone         Location: [] Take home patch   [] In clinic    []  Ice     []  heat  []  Ice massage  []  Laser   []  Anodyne Position:  Location:    []  Laser with stim  []  Other:  Position:  Location:   10 [x]  Vasopneumatic Device Pressure:       [] lo [x] med [] hi   Temperature: [x] lo [] med [] hi   [] Skin assessment post-treatment:  []intact []redness- no adverse reaction    []redness  adverse reaction: 52 min Therapeutic Exercise:  [] See flow sheet : PT reassessment, FOTO   Rationale: increase strength to improve the patients ability to ambulate and complete ADL's. 15 min Neuromuscular Re-education:  []  See flow sheet : 10 min 10/20 russian stim on L quad with lakisha supine BOSU step ups   Rationale: increase strength and improve coordination  to improve the patients ability to use proper gait mechanics and negotiate stairs without pain. H min Manual Therapy: Technique:      [x] S/DTM []IASTM [x]PROM [x] Passive Stretching   [] Graston:  [] GT 1  [] GT 2 [] GT 3 [] GT 4 [] GT 4 [] GT 5  [] GT 6  [] Sweep [] Fan [] [place]  [] Brush   []  Swivel []J- Stroke [] Scoop []IFraming     []Manual TPR  [] TDN (see objective; actual needle insertion time not billed)  [x]Jt manipulation:Gr I [] II []  III [x] IV[x] V[]  Treatment/Area:  L patellar mobilizations in all directions, L calf stretching and STM, left ITB, Quad, and Anterior Tibialis STM   Rationale:      decrease pain, increase ROM, increase tissue extensibility and decrease trigger points to improve patient's ability to walking without pain. With   [] TE   [] TA   [] neuro   [] other: Patient Education: [x] Review HEP    [] Progressed/Changed HEP based on:   [] positioning   [] body mechanics   [] transfers   [] heat/ice application    [] Graston Education: Explained the effects and benefits of Graston Technique therapy including potential for post treatment soreness and bruising. [] Other:      Other Objective/Functional Measures:   FOTO increased to 62/100 from 32/100 at evaluation  AROM of the L knee 5 to 0 to 115 deg  Noted quad lag on the L with SLR-continued [place] estim with progression to lakisha        Pain Level (0-10 scale) post treatment: 0/10    ASSESSMENT/Changes in Function:   Upon reassessment, pt presents with improved L knee AROM/strength leading to improvements in functional mobility and endurance.  He continues to lack L quad strength limiting tolerance to descending stairs with eccentric control. Pt is progressing per MD protocol. Pt would benefit from continued strengthening to the L quad to improve stairs and return to running when cleared by MD. Patient will continue to benefit from skilled PT services to address functional mobility deficits, address ROM deficits and address strength deficits to attain remaining goals. Progress towards goals / Updated goals: · Short Term Goals: To be accomplished in  4  weeks:  1. Pt will be I and compliant w/ HEP for self management of sx- pt is compliant with HEP 7/13/2020  2. Pt will demo L knee AROM at least 5-0-125 to improve gait mechanics and stair negotiation - progressing (5-0-115 deg) 8/3/2020  3. Pt will perform 2x10 SLR w/o extensor lag to improve R knee stability for ambulation and transfers. Progressing (1x10) SLR with decreased lag 8/3/2020  · Long Term Goals: To be accomplished in  8  weeks:  1. Pt will maintain dynamic SLS x 30\" w/o LOB to improve LE proprioception for progression to running. Met SLS 30 sec  2. Pt will perform 2x10 lateral tap downs w/ proper form and good eccentric control to progress to running and plyometrics NT 8/3/2020  3.  Improve FOTO score to >/= 75/100 to indicate improved function progressing 62/100 8/3/2020    PLAN  [x]  Upgrade activities as tolerated     [x]  Continue plan of care  []  Update interventions per flow sheet       []  Discharge due to:_  [x]  Other: 2-3x/week for 4 weeks    Joseph Altamirano DPT  8/3/2020  517  PM    Future Appointments   Date Time Provider Desmond Shukla   8/5/2020  2:00 PM [address] [place] [address] [facility]   8/10/2020  3:45 PM Alejandra Hines DPT [facility]   8/12/2020  2:30 PM [facility] [place] [facility]   8/17/2020  3:00 PM Marcos Abad PT [facility]   8/19/2020  3:00 PM [facility] PT [facility]   8/24/2020  3:45 PM ASIF DorseyT [facility]   8/26/2020  3:15 PM Alejandra Hines, DPT [facility] [address]   8/31/2020  3:00 PM ASIF BarclayT [facility] [address]

## 2020-08-05 ENCOUNTER — HOSPITAL ENCOUNTER (OUTPATIENT)
Dept: PHYSICAL THERAPY | Age: 25
Discharge: HOME | End: 2020-08-05
Payer: COMMERCIAL

## 2020-08-05 PROCEDURE — 97016 VASOPNEUMATIC DEVICE THERAPY: CPT

## 2020-08-05 PROCEDURE — 97112 NEUROMUSCULAR REEDUCATION: CPT

## 2020-08-05 PROCEDURE — 97110 THERAPEUTIC EXERCISES: CPT

## 2020-08-05 NOTE — PROGRESS NOTES
PT DAILY TREATMENT NOTE     Patient Name: Susana Garay  Date:2020  : 1995  [x]  Patient  Verified  Payor: Joe Francisco / Plan: [place] [employer]  CAPITATED PT / Product Type: Commerical /    In time: 2:00  Out time: 3:10  Total Treatment Time (min): 70  Visit #: 1 of     Treatment Area: Knee pain, left [M25.562]    SUBJECTIVE  Pain Level (0-10 scale): 0/10  Any medication changes, allergies to medications, adverse drug reactions, diagnosis change, or new procedure performed?: [x] No    [] Yes (see summary sheet for update)  Subjective functional status/changes:   [x] No changes reported  No changed reported. OBJECTIVE    Modality rationale: decrease inflammation and decrease pain to improve the patients ability to walking without pain. Min Type Additional Details    [] Estim:  []Unatt       []IFC  []Premod                        []Other:  []w/ice   []w/heat  Position:  Location:    [] Estim: []Att    []TENS instruct  []NMES                    []Other:  []w/US   []w/ice   []w/heat  Position:  Location:    []  Traction: [] Cervical       []Lumbar                       [] Prone          []Supine                       []Intermittent   []Continuous Lbs:  [] before manual  [] after manual    []  Ultrasound: []Continuous   [] Pulsed                           []1MHz   []3MHz W/cm2:  Location:    []  Iontophoresis with dexamethasone         Location: [] Take home patch   [] In clinic    []  Ice     []  heat  []  Ice massage  []  Laser   []  Anodyne Position:  Location:    []  Laser with stim  []  Other:  Position:  Location:   10 [x]  Vasopneumatic Device Pressure:       [] lo [x] med [] hi   Temperature: [x] lo [] med [] hi   [x] Skin assessment post-treatment:  [x]intact []redness- no adverse reaction    []redness  adverse reaction:     45 min Therapeutic Exercise:  [x] See flow sheet :    Rationale: increase strength to improve the patients ability to ambulate and complete ADL's.     15 min Neuromuscular Re-education:  [x]  See flow sheet : 10 min 10/20 russian stim on L quad with lakisha; BOSU step ups   Rationale: increase strength and improve coordination  to improve the patients ability to use proper gait mechanics and negotiate stairs without pain. H min Manual Therapy: Technique:      [x] S/DTM []IASTM [x]PROM [x] Passive Stretching   [] Graston:  [] GT 1  [] GT 2 [] GT 3 [] GT 4 [] GT 4 [] GT 5  [] GT 6  [] Sweep [] Fan [] [place]  [] Brush   []  Swivel []J- Stroke [] Scoop []IFraming     []Manual TPR  [] TDN (see objective; actual needle insertion time not billed)  [x]Jt manipulation:Gr I [] II []  III [x] IV[x] V[]  Treatment/Area:  L patellar mobilizations in all directions, L calf stretching and STM, left ITB, Quad, and Anterior Tibialis STM   Rationale:      decrease pain, increase ROM, increase tissue extensibility and decrease trigger points to improve patient's ability to walking without pain. With   [] TE   [] TA   [] neuro   [] other: Patient Education: [x] Review HEP    [] Progressed/Changed HEP based on:   [] positioning   [] body mechanics   [] transfers   [] heat/ice application    [] Graston Education: Explained the effects and benefits of Graston Technique therapy including potential for post treatment soreness and bruising. [] Other:      Other Objective/Functional Measures:   - challenged with SLS Rebounder on foam with YMB  - light progression of reps and holds as noted on flowsheet  - slight extensor lag with SLRs, visible ms fatigue      Pain Level (0-10 scale) post treatment: 0/10    ASSESSMENT/Changes in Function:   Patient progressing slowly and tolerated light progression of program well, no increase in pain just quad fatigue and shaking.      Pt would benefit from continued strengthening to the L quad to improve stairs and return to running when cleared by MD. Patient will continue to benefit from skilled PT services to address functional mobility deficits, address ROM deficits and address strength deficits to attain remaining goals. Progress towards goals / Updated goals:  New Goals to be achieved in __4__  weeks:  1. Pt will demo L knee AROM at least 5-0-125 to improve gait mechanics and stair negotiation   2. Pt will perform 2x10 lateral tap downs w/ proper form and good eccentric control to progress to running and plyometrics  3. Improve FOTO score to >/= 75/100 to indicate improved function.      PLAN  [x]  Upgrade activities as tolerated     [x]  Continue plan of care  []  Update interventions per flow sheet       []  Discharge due to:_  []  Other:     Juan M Brown 8/5/2020  3:10  PM    Future Appointments   Date Time Provider Desmond Shukla   8/5/2020  2:00 PM [address] [place] [address] 1 [facility]   8/10/2020  3:45 PM Araceli Pineda DPT [facility]   8/12/2020  2:30 PM [facility] [place] [facility]   8/17/2020  3:00 PM Jose Manuel Acosta PT [facility]   8/19/2020  3:00 PM [facility] [place] [facility]   8/24/2020  3:45 PM [facility] [place] [facility]   8/26/2020  3:15 PM Araceli Pineda DPT [facility]   8/31/2020  3:00 PM Araceli Pineda DPT [facility]

## 2020-08-10 ENCOUNTER — HOSPITAL ENCOUNTER (OUTPATIENT)
Dept: PHYSICAL THERAPY | Age: 25
Discharge: HOME | End: 2020-08-10
Payer: COMMERCIAL

## 2020-08-10 PROCEDURE — 97140 MANUAL THERAPY 1/> REGIONS: CPT | Performed by: PHYSICAL THERAPIST

## 2020-08-10 PROCEDURE — 97110 THERAPEUTIC EXERCISES: CPT | Performed by: PHYSICAL THERAPIST

## 2020-08-10 PROCEDURE — 97016 VASOPNEUMATIC DEVICE THERAPY: CPT | Performed by: PHYSICAL THERAPIST

## 2020-08-10 NOTE — PROGRESS NOTES
PT DAILY TREATMENT NOTE     Patient Name: Abran Mckeon  Date:8/10/2020  : 1995  [x]  Patient  Verified  Payor: Jose Manuel Acosta / Plan: [employer]  CAPITATED PT / Product Type: Commerical /    In time: 3:45  Out time: 5:00  Total Treatment Time (min): 75  Visit #: 2 of     Treatment Area: Knee pain, left [M25.562]    SUBJECTIVE  Pain Level (0-10 scale): 0/10  Any medication changes, allergies to medications, adverse drug reactions, diagnosis change, or new procedure performed?: [x] No    [] Yes (see summary sheet for update)  Subjective functional status/changes:   [] No changes reported  My L knee feels amazing. A little stiff in the calf but no pain in the knee. OBJECTIVE    Modality rationale: decrease inflammation and decrease pain to improve the patients ability to move the L LE through the available ROM with less pain.     Min Type Additional Details    [] Estim:  []Unatt       []IFC  []Premod                        []Other:  []w/ice   []w/heat  Position:  Location:    [] Estim: []Att    []TENS instruct  []NMES                    []Other:  []w/US   []w/ice   []w/heat  Position:  Location:    []  Traction: [] Cervical       []Lumbar                       [] Prone          []Supine                       []Intermittent   []Continuous Lbs:  [] before manual  [] after manual    []  Ultrasound: []Continuous   [] Pulsed                           []1MHz   []3MHz W/cm2:  Location:    []  Iontophoresis with dexamethasone         Location: [] Take home patch   [] In clinic    []  Ice     []  heat  []  Ice massage  []  Laser   []  Anodyne Position:  Location:    []  Laser with stim  []  Other:  Position:  Location:   15 [x]  Vasopneumatic Device Pressure:       [] lo [x] med [] hi   Temperature: [x] lo [] med [] hi   [] Skin assessment post-treatment:  []intact []redness- no adverse reaction    []redness  adverse reaction:     45 min Therapeutic Exercise:  [x] See flow sheet : added 2# to SLR   Rationale: increase ROM and increase strength to improve the patients ability to participate in recreational activities and work tasks with less pain in the L knee. 15 min Manual Therapy: Technique:      [x] S/DTM [x]IASTM []PROM [x] Passive Stretching   [] Graston:  [] GT 1  [] GT 2 [] GT 3 [] GT 4 [] GT 4 [] GT 5  [] GT 6  [] Sweep [] Fan [] [place]  [] Brush   []  Swivel []J- Stroke [] Scoop []IFraming     []Manual TPR  [] TDN (see objective; actual needle insertion time not billed)  []Jt manipulation:Gr I [] II []  III [] IV[] V[]  Treatment/Area:  STM/IASTM to L quad/calf/peroneals and Passive Stretching to L knee in both directions. Rationale:      decrease pain, increase ROM, increase tissue extensibility and decrease trigger points to improve patient's ability to walk with less pain in the L knee. With   [] TE   [] TA   [] neuro   [] other: Patient Education: [x] Review HEP    [] Progressed/Changed HEP based on:   [] positioning   [] body mechanics   [] transfers   [] heat/ice application    [] Graston Education: Explained the effects and benefits of Graston Technique therapy including potential for post treatment soreness and bruising. [] Other:      Other Objective/Functional Measures:     L Knee PROM (8-0-130 deg) before manual and (8-0-135 deg) after manual  L Knee AROM (5-0-120 deg)    Patient completed 10 reps of SLR without quad lag as well as 10 reps of SLR with 2# with minimal quad lag. Pain Level (0-10 scale) post treatment: 0/10    ASSESSMENT/Changes in Function:     Patient was progressed to 2# SLR because quad lag was not present in non-weighted SLR's. TRX SLS/DLS added to flow sheet with no discomfort noted during exercises. Patients L knee ROM was improved after manual from previous visits (8-0-135 deg). Continue with current PT POC.     Patient will continue to benefit from skilled PT services to modify and progress therapeutic interventions, address functional mobility deficits, address ROM deficits, address strength deficits, analyze and address soft tissue restrictions and analyze and cue movement patterns to attain remaining goals. Progress towards goals / Updated goals:  1. Pt will demo L knee AROM at least 5-0-125 to improve gait mechanics and stair negotiation   2. Pt will perform 2x10 lateral tap downs w/ proper form and good eccentric control to progress to running and plyometrics  3.  Improve FOTO score to >/= 75/100 to indicate improved function.     PLAN  []  Upgrade activities as tolerated     [x]  Continue plan of care  []  Update interventions per flow sheet       []  Discharge due to:_  [x]  Other: check goals    Jordan Swanson SPT 8/10/2020  5:00 PM    Future Appointments   Date Time Provider Desmond Shukla   8/12/2020  2:30 PM [address] [place] [address] [facility]   8/17/2020  3:00 PM Saskia Gan, PT [facility]   8/19/2020  3:00 PM [facility] [place] [facility]   8/24/2020  3:45 PM [facility] [place] [facility]   8/26/2020  3:15 PM Reuben Stringer DPT [facility]   8/31/2020  3:00 PM Reuben Srtinger DPT [facility]

## 2020-08-12 ENCOUNTER — HOSPITAL ENCOUNTER (OUTPATIENT)
Dept: PHYSICAL THERAPY | Age: 25
Discharge: HOME | End: 2020-08-12
Payer: COMMERCIAL

## 2020-08-12 PROCEDURE — 97016 VASOPNEUMATIC DEVICE THERAPY: CPT

## 2020-08-12 PROCEDURE — 97110 THERAPEUTIC EXERCISES: CPT

## 2020-08-12 NOTE — PROGRESS NOTES
PT DAILY TREATMENT NOTE     Patient Name: Mike Castrejon  Date:2020  : 1995  [x]  Patient  Verified  Payor: Mario Fonseca / Plan: [employer]  CAPITATED PT / Product Type: Commerical /    In time: 2:31  Out time: 3:30  Total Treatment Time (min): 59  Visit #: 3 of     Treatment Area: Knee pain, left [M25.562]    SUBJECTIVE  Pain Level (0-10 scale): 0/10 \"sore\"  Any medication changes, allergies to medications, adverse drug reactions, diagnosis change, or new procedure performed?: [x] No    [] Yes (see summary sheet for update)  Subjective functional status/changes:   [] No changes reported  I am sore. I think because yo all rolled my calf out and it felt great so I overdid it. OBJECTIVE    Modality rationale: decrease inflammation and decrease pain to improve the patients ability to move the L LE through the available ROM with less pain.     Min Type Additional Details    [] Estim:  []Unatt       []IFC  []Premod                        []Other:  []w/ice   []w/heat  Position:  Location:    [] Estim: []Att    []TENS instruct  []NMES                    []Other:  []w/US   []w/ice   []w/heat  Position:  Location:    []  Traction: [] Cervical       []Lumbar                       [] Prone          []Supine                       []Intermittent   []Continuous Lbs:  [] before manual  [] after manual    []  Ultrasound: []Continuous   [] Pulsed                           []1MHz   []3MHz W/cm2:  Location:    []  Iontophoresis with dexamethasone         Location: [] Take home patch   [] In clinic    []  Ice     []  heat  []  Ice massage  []  Laser   []  Anodyne Position:  Location:    []  Laser with stim  []  Other:  Position:  Location:   15 [x]  Vasopneumatic Device Pressure:       [] lo [x] med [] hi   Temperature: [x] lo [] med [] hi   [x] Skin assessment post-treatment:  [x]intact []redness- no adverse reaction    []redness  adverse reaction:     44 min Therapeutic Exercise:  [x] See flow sheet : Rationale: increase ROM and increase strength to improve the patients ability to participate in recreational activities and work tasks with less pain in the L knee. PD min Manual Therapy: Technique:      [x] S/DTM [x]IASTM []PROM [x] Passive Stretching   [] Graston:  [] GT 1  [] GT 2 [] GT 3 [] GT 4 [] GT 4 [] GT 5  [] GT 6  [] Sweep [] Fan [] [place]  [] Brush   []  Swivel []J- Stroke [] Scoop []IFraming     []Manual TPR  [] TDN (see objective; actual needle insertion time not billed)  []Jt manipulation:Gr I [] II []  III [] IV[] V[]  Treatment/Area:  STM/IASTM to L quad/calf/peroneals and Passive Stretching to L knee in both directions. Rationale:      decrease pain, increase ROM, increase tissue extensibility and decrease trigger points to improve patient's ability to walk with less pain in the L knee. With   [] TE   [] TA   [] neuro   [] other: Patient Education: [x] Review HEP    [] Progressed/Changed HEP based on:   [] positioning   [] body mechanics   [] transfers   [] heat/ice application    [] Graston Education: Explained the effects and benefits of Graston Technique therapy including potential for post treatment soreness and bruising. [] Other:      Other Objective/Functional Measures:   - difficulty with SL TRX squats due to ms fatigue and shaking  - pt declined MT    Pain Level (0-10 scale) post treatment: 0/10    ASSESSMENT/Changes in Function:   Patient continues to tolerate progression well with no increased pain just ms fatigue. Will continue to progress as able while monitoring sx. Patient will continue to benefit from skilled PT services to modify and progress therapeutic interventions, address functional mobility deficits, address ROM deficits, address strength deficits, analyze and address soft tissue restrictions and analyze and cue movement patterns to attain remaining goals.      Progress towards goals / Updated goals:  1. Pt will demo L knee AROM at least 5-0-125 to improve gait mechanics and stair negotiation   2. Pt will perform 2x10 lateral tap downs w/ proper form and good eccentric control to progress to running and plyometrics  3.  Improve FOTO score to >/= 75/100 to indicate improved function.     PLAN  []  Upgrade activities as tolerated     [x]  Continue plan of care  []  Update interventions per flow sheet       []  Discharge due to:_  [x]  Other: check goals    Radha Salgadojohnathan Loverylan 8/12/2020  3:30 PM    Future Appointments   Date Time Provider Desmond Shukla   8/12/2020  2:30 PM [address] [place] [address] [facility]   8/17/2020  3:00 PM Joe Francisco, PT [facility]   8/19/2020  3:00 PM [facility]   8/24/2020  3:45 PM [facility]   8/26/2020  3:15 PM Madelaine Number, DPT [facility]   8/31/2020  3:00 PM Madelaine Number, DPT [facility]

## 2020-08-17 ENCOUNTER — HOSPITAL ENCOUNTER (OUTPATIENT)
Dept: PHYSICAL THERAPY | Age: 25
Discharge: HOME | End: 2020-08-17
Payer: COMMERCIAL

## 2020-08-17 PROCEDURE — 97016 VASOPNEUMATIC DEVICE THERAPY: CPT | Performed by: PHYSICAL THERAPIST

## 2020-08-17 PROCEDURE — 97110 THERAPEUTIC EXERCISES: CPT | Performed by: PHYSICAL THERAPIST

## 2020-08-17 NOTE — PROGRESS NOTES
PT DAILY TREATMENT NOTE     Patient Name: Sherice Nair  SVSS:  : 1995  [x]  Patient  Verified  Payor: Tiffanie Manuel / Plan: [employer]  CAPITATED PT / Product Type: Commerical /    In time: 305pm   Out time: 410pm  Total Treatment Time (min): 65min  Visit #: 4  of     Treatment Area: Knee pain, left [M25.562]    SUBJECTIVE  Pain Level (0-10 scale): 0/10   Any medication changes, allergies to medications, adverse drug reactions, diagnosis change, or new procedure performed?: [x] No    [] Yes (see summary sheet for update)  Subjective functional status/changes:   [] No changes reported  Feel like my main limitation is just weakness. Carrying heavy objects up and down stairs Is hard. OBJECTIVE    Modality rationale: decrease inflammation and decrease pain to improve the patients ability to move the L LE through the available ROM with less pain.     Min Type Additional Details    [] Estim:  []Unatt       []IFC  []Premod                        []Other:  []w/ice   []w/heat  Position:  Location:    [] Estim: []Att    []TENS instruct  []NMES                    []Other:  []w/US   []w/ice   []w/heat  Position:  Location:    []  Traction: [] Cervical       []Lumbar                       [] Prone          []Supine                       []Intermittent   []Continuous Lbs:  [] before manual  [] after manual    []  Ultrasound: []Continuous   [] Pulsed                           []1MHz   []3MHz W/cm2:  Location:    []  Iontophoresis with dexamethasone         Location: [] Take home patch   [] In clinic    []  Ice     []  heat  []  Ice massage  []  Laser   []  Anodyne Position:  Location:    []  Laser with stim  []  Other:  Position:  Location:   15 [x]  Vasopneumatic Device Pressure:       [] lo [x] med [] hi   Temperature: [x] lo [] med [] hi   [x] Skin assessment post-treatment:  [x]intact []redness- no adverse reaction    []redness  adverse reaction:     50/45 min Therapeutic Exercise:  [x] See flow sheet : added eccentric step downs, and clams    Rationale: increase ROM and increase strength to improve the patients ability to participate in recreational activities and work tasks with less pain in the L knee. PD min Manual Therapy: Technique:      [x] S/DTM [x]IASTM []PROM [x] Passive Stretching   [] Graston:  [] GT 1  [] GT 2 [] GT 3 [] GT 4 [] GT 4 [] GT 5  [] GT 6  [] Sweep [] Fan [] [place]  [] Brush   []  Swivel []J- Stroke [] Scoop []IFraming     []Manual TPR  [] TDN (see objective; actual needle insertion time not billed)  []Jt manipulation:Gr I [] II []  III [] IV[] V[]  Treatment/Area:  STM/IASTM to L quad/calf/peroneals and Passive Stretching to L knee in both directions. Rationale:      decrease pain, increase ROM, increase tissue extensibility and decrease trigger points to improve patient's ability to walk with less pain in the L knee. With   [] TE   [] TA   [] neuro   [] other: Patient Education: [x] Review HEP    [] Progressed/Changed HEP based on:   [] positioning   [] body mechanics   [] transfers   [] heat/ice application    [] Graston Education: Explained the effects and benefits of Graston Technique therapy including potential for post treatment soreness and bruising. [] Other:      Other Objective/Functional Measures:     Pt at 9 weeks and 4 days    L knee AROM: 0-125deg      SL instability noted with TG squats at LV 23  Added 4in eccentric step down with mod shaking, progress to 6in nv. Added in clams for ER due to knee valgus  Mild SLR quad lag -6deg. Pain Level (0-10 scale) post treatment: 0/10    ASSESSMENT/Changes in Function:   Pt presents with decreased eccentric strength especially when first unlocking knee from extended position. Slight knee valgus noted with squats and SL activities. Decreased VMO activation noted with slight quad lag for SLR flexion. Progress to functional TKE with standing TB TKE NV.    Patient will continue to benefit from skilled PT services to modify and progress therapeutic interventions, address functional mobility deficits, address ROM deficits, address strength deficits, analyze and address soft tissue restrictions and analyze and cue movement patterns to attain remaining goals. Progress towards goals / Updated goals:  1. Pt will demo L knee AROM at least 5-0-125 to improve gait mechanics and stair negotiation . MET 0-125deg 8-  2. Pt will perform 2x10 lateral tap downs w/ proper form and good eccentric control to progress to running and plyometrics  3.  Improve FOTO score to >/= 75/100 to indicate improved function.     PLAN  []  Upgrade activities as tolerated     [x]  Continue plan of care  []  Update interventions per flow sheet       []  Discharge due to:_  [x]  Other: check goals    Marta Pulido, PT 8/17/2020  3:30 PM    Future Appointments   Date Time Provider Desmond Shukla   8/17/2020  3:00 PM Joe Francisco, PT [facility]   8/19/2020  3:00 PM [facility] PT [facility]   8/24/2020  3:45 PM [facility] PT [facility]   8/26/2020  3:15 PM [facility]   8/31/2020  3:00 PM ASIF Hel[address]T [facility]

## 2020-08-19 ENCOUNTER — HOSPITAL ENCOUNTER (OUTPATIENT)
Dept: PHYSICAL THERAPY | Age: 25
Discharge: HOME | End: 2020-08-19
Payer: COMMERCIAL

## 2020-08-19 PROCEDURE — 97016 VASOPNEUMATIC DEVICE THERAPY: CPT | Performed by: GENERAL ACUTE CARE HOSPITAL

## 2020-08-19 PROCEDURE — 97110 THERAPEUTIC EXERCISES: CPT | Performed by: GENERAL ACUTE CARE HOSPITAL

## 2020-08-19 NOTE — PROGRESS NOTES
PT DAILY TREATMENT NOTE     Patient Name: Reba Preston  OHSS:[id number]  : 1995  [x]  Patient  Verified  Payor: Holly Dry / Plan: [place] [employer]  CAPITATED PT / Product Type: Commerical /    In time: 300   Out time: 410  Total Treatment Time (min): 70   Visit #: 5 of     Treatment Area: Knee pain, left [M25.562]    SUBJECTIVE  Pain Level (0-10 scale): 0/10   Any medication changes, allergies to medications, adverse drug reactions, diagnosis change, or new procedure performed?: [x] No    [] Yes (see summary sheet for update)  Subjective functional status/changes:   [] No changes reported  No complaints today. Remains with weakness in L quad that limits lifting/carrying. OBJECTIVE    Modality rationale: decrease inflammation and decrease pain to improve the patients ability to move the L LE through the available ROM with less pain.     Min Type Additional Details    [] Estim:  []Unatt       []IFC  []Premod                        []Other:  []w/ice   []w/heat  Position:  Location:    [] Estim: []Att    []TENS instruct  []NMES                    []Other:  []w/US   []w/ice   []w/heat  Position:  Location:    []  Traction: [] Cervical       []Lumbar                       [] Prone          []Supine                       []Intermittent   []Continuous Lbs:  [] before manual  [] after manual    []  Ultrasound: []Continuous   [] Pulsed                           []1MHz   []3MHz W/cm2:  Location:    []  Iontophoresis with dexamethasone         Location: [] Take home patch   [] In clinic    []  Ice     []  heat  []  Ice massage  []  Laser   []  Anodyne Position:  Location:    []  Laser with stim  []  Other:  Position:  Location:   15 [x]  Vasopneumatic Device Pressure:       [] lo [x] med [] hi   Temperature: [x] lo [] med [] hi   [x] Skin assessment post-treatment:  [x]intact []redness- no adverse reaction    []redness  adverse reaction:     55/50 min Therapeutic Exercise:  [x] See flow sheet :    Rationale: increase ROM and increase strength to improve the patients ability to participate in recreational activities and work tasks with less pain in the L knee. PD min Manual Therapy: Technique:      [x] S/DTM [x]IASTM []PROM [x] Passive Stretching     Treatment/Area:  STM/IASTM to L quad/calf/peroneals and Passive Stretching to L knee in both directions. Rationale:      decrease pain, increase ROM, increase tissue extensibility and decrease trigger points to improve patient's ability to walk with less pain in the L knee. With   [] TE   [] TA   [] neuro   [] other: Patient Education: [x] Review HEP    [] Progressed/Changed HEP based on:   [] positioning   [] body mechanics   [] transfers   [] heat/ice application    [] Graston Education: Explained the effects and benefits of Graston Technique therapy including potential for post treatment soreness and bruising. [] Other:      Other Objective/Functional Measures:   Progressed to 6\" eccentric taps downs   Added standing TKE with BTB   L knee AROM: 0-125 deg   Mild SLR quad lag -6 deg     Pain Level (0-10 scale) post treatment: 0/10    ASSESSMENT/Changes in Function:   Pt continues to demo quad weakness and fatigue with increased instability noted with eccentric taps downs & SL TRX squats. Eccentric weakness present initially after unlocking knee. Fair SLS stance balance with rebounder. Pt will continue to benefit from eccentric quad strengthening. Patient will continue to benefit from skilled PT services to modify and progress therapeutic interventions, address functional mobility deficits, address ROM deficits, address strength deficits, analyze and address soft tissue restrictions and analyze and cue movement patterns to attain remaining goals. Progress towards goals / Updated goals:  1. Pt will demo L knee AROM at least 5-0-125 to improve gait mechanics and stair negotiation . MET 0-125deg 8-  2.  Pt will perform 2x10 lateral tap downs w/ proper form and good eccentric control to progress to running and plyometrics Increased to 6\" height step downs (8/19/20)  3.  Improve FOTO score to >/= 75/100 to indicate improved function.     PLAN  []  Upgrade activities as tolerated     [x]  Continue plan of care  []  Update interventions per flow sheet       []  Discharge due to:_  [x]  Other: check goals    Amauri Cook, PT 8/19/2020  3:30 PM    Future Appointments   Date Time Provider Desmond Shukla   8/19/2020  3:00 PM [facility] [place] [facility]   8/24/2020  3:45 PM [facility] [place] [facility]   8/26/2020  3:15 PM Wandra Antis, DPT [facility]   8/31/2020  3:00 PM Wandra Antis, DPT [facility]

## 2020-08-24 ENCOUNTER — HOSPITAL ENCOUNTER (OUTPATIENT)
Dept: PHYSICAL THERAPY | Age: 25
Discharge: HOME | End: 2020-08-24
Payer: COMMERCIAL

## 2020-08-24 PROCEDURE — 97110 THERAPEUTIC EXERCISES: CPT | Performed by: GENERAL ACUTE CARE HOSPITAL

## 2020-08-24 PROCEDURE — 97016 VASOPNEUMATIC DEVICE THERAPY: CPT | Performed by: GENERAL ACUTE CARE HOSPITAL

## 2020-08-24 PROCEDURE — 97140 MANUAL THERAPY 1/> REGIONS: CPT | Performed by: GENERAL ACUTE CARE HOSPITAL

## 2020-08-24 NOTE — PROGRESS NOTES
PT DAILY TREATMENT NOTE     Patient Name: Tracy Friedman  Date:2020  : 1995  [x]  Patient  Verified  Payor: Dorcas Fernandez / Plan: [address] [place] [address] Rd PT / Product Type: Commerical /    In time: 345   Out time:  455  Total Treatment Time (min): 80  Visit #: 6 of     Treatment Area: Knee pain, left [M25.562]    SUBJECTIVE  Pain Level (0-10 scale): 0/10   Any medication changes, allergies to medications, adverse drug reactions, diagnosis change, or new procedure performed?: [x] No    [] Yes (see summary sheet for update)  Subjective functional status/changes:   [] No changes reported  Reports increased stiffness and soreness today after riding in a car for >17 hours over the weekend. OBJECTIVE    Modality rationale: decrease inflammation and decrease pain to improve the patients ability to move the L LE through the available ROM with less pain.     Min Type Additional Details    [] Estim:  []Unatt       []IFC  []Premod                        []Other:  []w/ice   []w/heat  Position:  Location:    [] Estim: []Att    []TENS instruct  []NMES                    []Other:  []w/US   []w/ice   []w/heat  Position:  Location:    []  Traction: [] Cervical       []Lumbar                       [] Prone          []Supine                       []Intermittent   []Continuous Lbs:  [] before manual  [] after manual    []  Ultrasound: []Continuous   [] Pulsed                           []1MHz   []3MHz W/cm2:  Location:    []  Iontophoresis with dexamethasone         Location: [] Take home patch   [] In clinic    []  Ice     []  heat  []  Ice massage  []  Laser   []  Anodyne Position:  Location:    []  Laser with stim  []  Other:  Position:  Location:   15 [x]  Vasopneumatic Device Pressure:       [] lo [x] med [] hi   Temperature: [x] lo [] med [] hi   [x] Skin assessment post-treatment:  [x]intact []redness- no adverse reaction    []redness  adverse reaction:     65/55 min Therapeutic Exercise:  [x] See flow sheet : NC for warm up and waiting for therapist   Rationale: increase ROM and increase strength to improve the patients ability to participate in recreational activities and work tasks with less pain in the L knee. PD min Manual Therapy: Technique:      [x] S/DTM [x]IASTM []PROM [x] Passive Stretching     Treatment/Area:  STM/IASTM to L quad/calf/peroneals and Passive Stretching to L knee in both directions. Rationale:      decrease pain, increase ROM, increase tissue extensibility and decrease trigger points to improve patient's ability to walk with less pain in the L knee. With   [] TE   [] TA   [] neuro   [] other: Patient Education: [x] Review HEP    [] Progressed/Changed HEP based on:   [] positioning   [] body mechanics   [] transfers   [] heat/ice application    [] Graston Education: Explained the effects and benefits of Graston Technique therapy including potential for post treatment soreness and bruising. [] Other:      Other Objective/Functional Measures:   TG Lv 23, SLR with 4# weight  Added TB side step for hip abd strengthening     Pain Level (0-10 scale) post treatment: 0/10     ASSESSMENT/Changes in Function:   Pt continues to demo quad weakness and fatigue with increased instability noted with eccentric taps downs & SL TRX squats. Pt will continue to benefit from eccentric quad strengthening. Added dynamic hip abd strengthening for improved SL stability. Next MD FU in mid-September & can progress plyometrics and running at that time. Patient will continue to benefit from skilled PT services to modify and progress therapeutic interventions, address functional mobility deficits, address ROM deficits, address strength deficits, analyze and address soft tissue restrictions and analyze and cue movement patterns to attain remaining goals. Progress towards goals / Updated goals:  1. Pt will demo L knee AROM at least 5-0-125 to improve gait mechanics and stair negotiation .  MET 0-125deg 8-  2. Pt will perform 2x10 lateral tap downs w/ proper form and good eccentric control to progress to running and plyometrics Increased to 6\" height step downs (8/19/20)  3.  Improve FOTO score to >/= 75/100 to indicate improved function.     PLAN  []  Upgrade activities as tolerated     [x]  Continue plan of care  []  Update interventions per flow sheet       []  Discharge due to:_  [x]  Other:     Lesly Rockwell, PT 8/24/2020  3:30 PM    Future Appointments   Date Time Provider Desmond Shukla   8/24/2020  3:45 PM [address] [place] [address] 3 [facility]   8/26/2020  3:15 PM Carmen Srivastava DPT [facility]   8/31/2020  3:00 PM Carmen Srivastava DPT [facility]

## 2020-08-26 ENCOUNTER — APPOINTMENT (OUTPATIENT)
Dept: PHYSICAL THERAPY | Age: 25
End: 2020-08-26
Payer: COMMERCIAL

## 2020-08-27 ENCOUNTER — APPOINTMENT (OUTPATIENT)
Dept: PHYSICAL THERAPY | Age: 25
End: 2020-08-27
Payer: COMMERCIAL

## 2020-08-31 ENCOUNTER — HOSPITAL ENCOUNTER (OUTPATIENT)
Dept: PHYSICAL THERAPY | Age: 25
Discharge: HOME | End: 2020-08-31
Payer: COMMERCIAL

## 2020-08-31 PROCEDURE — 97016 VASOPNEUMATIC DEVICE THERAPY: CPT | Performed by: PHYSICAL THERAPIST

## 2020-08-31 PROCEDURE — 97110 THERAPEUTIC EXERCISES: CPT | Performed by: PHYSICAL THERAPIST

## 2020-08-31 NOTE — PROGRESS NOTES
PT DAILY TREATMENT NOTE     Patient Name: Mike Castrejon  Date:2020  : 1995  [x]  Patient  Verified  Payor: Mario Fonseca / [facility] [employer] Type: Com[facility] /    In time: 305   Out time:  406  Total Treatment Time (min): 61  Visit #: 7 of     Treatment Area: Knee pain, left [M25.562]    SUBJECTIVE  Pain Level (0-10 scale): 0/10   Any medication changes, allergies to medications, adverse drug reactions, diagnosis change, or new procedure performed?: [x] No    [] Yes (see summary sheet for update)  Subjective functional status/changes:   [] No changes reported  Pt reports 85-90% improvement since SOC. He reports improvement in overall strength and ROM of the R knee. Functional limitations include continued muscle weakness not allowing him to run or do any plyometric activities. OBJECTIVE    Modality rationale: decrease inflammation and decrease pain to improve the patients ability to move the L LE through the available ROM with less pain.     Min Type Additional Details    [] Estim:  []Unatt       []IFC  []Premod                        []Other:  []w/ice   []w/heat  Position:  Location:    [] Estim: []Att    []TENS instruct  []NMES                    []Other:  []w/US   []w/ice   []w/heat  Position:  Location:    []  Traction: [] Cervical       []Lumbar                       [] Prone          []Supine                       []Intermittent   []Continuous Lbs:  [] before manual  [] after manual    []  Ultrasound: []Continuous   [] Pulsed                           []1MHz   []3MHz W/cm2:  Location:    []  Iontophoresis with dexamethasone         Location: [] Take home patch   [] In clinic    []  Ice     []  heat  []  Ice massage  []  Laser   []  Anodyne Position:  Location:    []  Laser with stim  []  Other:  Position:  Location:   15 [x]  Vasopneumatic Device Pressure:       [] lo [x] med [] hi   Temperature: [x] lo [] med [] hi   [x] Skin assessment post-treatment:  [x]intact []redness- no adverse reaction    []redness  adverse reaction:     46 min Therapeutic Exercise:  [x] See flow sheet : PT reassessment, FOTO   Rationale: increase ROM and increase strength to improve the patients ability to participate in recreational activities and work tasks with less pain in the L knee. With   [] TE   [] TA   [] neuro   [] other: Patient Education: [x] Review HEP    [] Progressed/Changed HEP based on:   [] positioning   [] body mechanics   [] transfers   [] heat/ice application    [] Graston Education: Explained the effects and benefits of Graston Technique therapy including potential for post treatment soreness and bruising. [] Other:      Other Objective/Functional Measures:   AROM of the L knee 0-125 deg  Pt was able to perform 6\" step down with slight quievering of the L quad and medial knee collapse  FOTO increased to 75/100 from 62/100 at last assessment      Pain Level (0-10 scale) post treatment: 0/10     ASSESSMENT/Changes in Function:   X see DC summary    Progress towards goals / Updated goals:  1. Pt will demo L knee AROM at least 5-0-125 to improve gait mechanics and stair negotiation . MET 0-125deg 8-  2. Pt will perform 2x10 lateral tap downs w/ proper form and good eccentric control to progress to running and plyometrics progressing, 8/31/2020  3. Improve FOTO score to >/= 75/100 to indicate improved function. met 08/31/2020    PLAN  [x]  Discharge due to: progressing towards all long-term goals, pt has reached max benefit from insurance. DC'd to IND program      Juma Rao DPT 8/31/2020  4:30 PM    No future appointments.

## 2020-11-30 NOTE — PROGRESS NOTES
[address] [facility] AT [facility]  [address] [address], [place]  Phone [phone]  Fax [phone]  DISCHARGE SUMMARY  Patient Name: Sarika Musa : 1995   Treatment/Medical Diagnosis: Knee pain, left [M25.562]   Referral Source: Jaleesa Omer MD     Date of Initial Visit: 2020 Attended Visits: 17 Missed Visits: 2     SUMMARY OF TREATMENT  Pt seen for L ACL repair on 2020 for 17 sessions. Therapy has closely followed MD protocol to increase strength of the L knee to return to PLOF. CURRENT STATUS  Pt reports 85-90% improvement since [facility]. He reports improvement in overall strength and ROM of the R knee. Functional limitations include continued muscle weakness not allowing him to run or do any plyometric activities. Upon reassessment, pt presents with improvements in L knee ROM/strength leading to improved functional mobility. Pt to be DC'd this date due to insurance benefits. He is progressing toward long-term goals and is ready to continue to maintain/maximize gains with IND with HEP.      Other Objective/Functional Measures:   AROM of the L knee 0-125 deg  Pt was able to perform 6\" step down with slight quievering of the L quad and medial knee collapse  FOTO increased to 75/100 from 62/100 at last assessment    Other Objective/Functional Measures: taken on 8/3/2020  FOTO increased to 62/100 from 32/100 at evaluation  AROM of the L knee 5 to 0 to 115 deg  Noted quad lag on the L with SLR-continued russian       Goal/Measure of Progress Goal Met? Progress towards goals / Updated goals:  1. Pt will demo L knee AROM at least 5-0-125 to improve gait mechanics and stair negotiation . MET 0-125deg 2020  2. Pt will perform 2x10 lateral tap downs w/ proper form and good eccentric control to progress to running and plyometrics progressing, 2020  3.  Improve FOTO score to >/= 75/100 to indicate improved function. met 08/31/2020    RECOMMENDATIONS  Discontinue therapy. Progressing towards or have reached established goals. If you have any questions/comments please contact us directly at [phone]. Thank you for allowing us to assist in the care of your patient.     Therapist Signature: Spring Beyer DPT Date: 11/30/2020     Time: 9:20 AM

## 2021-05-26 ENCOUNTER — HOSPITAL ENCOUNTER (OUTPATIENT)
Dept: PHYSICAL THERAPY | Age: 26
Discharge: HOME | End: 2021-05-26
Payer: MEDICAID

## 2021-05-26 PROCEDURE — 97162 PT EVAL MOD COMPLEX 30 MIN: CPT
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
## 2021-05-27 ENCOUNTER — HOSPITAL ENCOUNTER (OUTPATIENT)
Dept: PHYSICAL THERAPY | Age: 26
Discharge: HOME | End: 2021-05-27
Payer: MEDICAID

## 2021-05-27 PROCEDURE — 97014 ELECTRIC STIMULATION THERAPY: CPT

## 2021-05-27 PROCEDURE — 97110 THERAPEUTIC EXERCISES: CPT

## 2021-05-27 PROCEDURE — 97530 THERAPEUTIC ACTIVITIES: CPT
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
## 2021-06-01 ENCOUNTER — HOSPITAL ENCOUNTER (OUTPATIENT)
Dept: PHYSICAL THERAPY | Age: 26
Discharge: HOME | End: 2021-06-01
Payer: MEDICAID

## 2021-06-01 PROCEDURE — 97014 ELECTRIC STIMULATION THERAPY: CPT

## 2021-06-01 PROCEDURE — 97110 THERAPEUTIC EXERCISES: CPT

## 2021-06-01 PROCEDURE — 97112 NEUROMUSCULAR REEDUCATION: CPT

## 2021-06-01 PROCEDURE — 97530 THERAPEUTIC ACTIVITIES: CPT

## 2021-06-01 PROCEDURE — 97116 GAIT TRAINING THERAPY: CPT
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
## 2021-06-02 ENCOUNTER — HOSPITAL ENCOUNTER (OUTPATIENT)
Dept: PHYSICAL THERAPY | Age: 26
Discharge: HOME | End: 2021-06-02
Payer: MEDICAID

## 2021-06-02 PROCEDURE — 97110 THERAPEUTIC EXERCISES: CPT

## 2021-06-02 PROCEDURE — 97112 NEUROMUSCULAR REEDUCATION: CPT

## 2021-06-02 PROCEDURE — 97014 ELECTRIC STIMULATION THERAPY: CPT

## 2021-06-02 PROCEDURE — 97530 THERAPEUTIC ACTIVITIES: CPT

## 2021-06-02 NOTE — PROGRESS NOTES
PHYSICAL THERAPY - DAILY TREATMENT NOTE    Patient Name: Lilliana Campos        Date: 2021  : 1995   YES Patient  Verified  Visit #:     Insurance: Payor: Dayna Garcia / Plan: [address] [place] [address] / Product Type: Managed Care Medicaid /      In time: 642 Out time: 3292   Total Treatment Time: 64     Medicare/BCBS Time Tracking (below)   Total Timed Codes (min):  49 1:1 Treatment Time:  49     TREATMENT AREA =  Left Knee Pain    SUBJECTIVE    Pain Level (on 0 to 10 scale):  1  / 10   Medication Changes/New allergies or changes in medical history, any new surgeries or procedures? NO    If yes, update Summary List   Subjective Functional Status/Changes:  []  No changes reported     \"is there anyway to build up my arch back up. \"    Compliance with HEP  Yes [x] No []         OBJECTIVE  Therapeutic Procedures:  Min Procedure Specifics + Rationale   29 [x] Therapeutic Exercise    See Flowsheet   Rationale: increase ROM and increase strength to improve the patients ability to participate in ADL's      10 [x] Therapeutic Activity See Flow Sheet  Rationale: To improve safety, proprioception, coordination, and efficiency with tasks such as stair negotiation, transfers, bed mobility, and lifting mechanics   10 [x] Neuromuscular Re-Education See Flow Sheet  Rationale:    To improve stability over various surfaces to decrease falls risk           Modality rationale: decrease inflammation, decrease pain, increase tissue extensibility and increase muscle contraction/control to improve the patients ability to perform ADL's with greater ease   Time: 13' Additional Details    [x] E-Stim:       [] Att                 [x] Unatt    [place] to Left Quad  2\" Ramp  10\" on 30\" off  15 minutes of SAQ Location:   [x] supine              [] prone  [] legs elevatedv   [] legs flat  []w/heat  []w/ice  []w/US     []  Traction:   [] Cervical          []Lumbar                          [] Intermitent []Continuous Step x1 Ramp/hold/ lbs:     Angle:  [] supine              [] prone  [] legs elevated    [] legs flat    []  Heat          [] pre-ADAM          [] post-AADM Location:    [] supine              [] prone  [] legs elevated    [] legs flat    []  Cold Pack  [] pre-ADAM          [] post-ADAM  []  Ice massage Location:    [] supine              [] prone  [] legs elevated    [] legs flat    []  Vasopneumatic Device, press/temp                              Location  [] Right Knee []Left Shoulder  [] Left Knee []Right Ankle  [] Right Shoulder [] Left Ankle    Skin assessment post-treatment:  intact no adverse reaction          min Patient Education:  YES Reviewed HEP         Other Objective/Functional Measures:    See flowsheet for more details    Patient Education regarding the following during session:  1.educated pt on arch doming and Toe Yoga 1-2 movements secondary to request for arch exercises. added prone quad stretch  Added SLS KB passes for balance. (pt standing on 1 LE with slight lean forward and knee flexed. Pt passing KB between UE's)     VC and demos required throughout session for proper form and increased safety         Post Treatment Pain Level (on 0 to 10) scale:   0  / 10     ASSESSMENT    Assessment/Changes in Function:     Pt is improving in closed and open chain movements, as well as balance. Continues to demonstrate quad weakness. []  See Progress Note/Recertification   Patient will continue to benefit from skilled PT services to analyze,, cue,, progress,, modify,, demonstrate,, instruct, and address, movement patterns,, therapeutic interventions,, postural abnormalities,, soft tissue restrictions,, ROM,, strength,, functional mobility,, body mechanics/ergonomics, and home and community integration, to attain remaining goals. Progress toward goals / Updated goals:    1.  Pt will be compliant with HEP for symptom management at home.  -compliant c HEP  2. Pt will demonstrate no extensor lag with SLR to improve stance phase.  -addressed c NMES  3. Pt will demonstrate knee flexion AROM to at least 110 to improve gait training.  -added prone quad stretch  4. Pt will increase FOTO Functional Status score to 52 to improve rehabilitative outcomes.        PLAN    [x]  Upgrade activities as tolerated YES Continue plan of care   []  Discharge due to :    []  Other:      Therapist: Urban Zavala DPT    Date: 6/2/2021 Time: 9:57 AM     Future Appointments   Date Time Provider Desmond Shukla   6/2/2021 10:15 AM Evone Neer [facility]   6/4/2021 11:45 AM Evone [facility]   6/7/2021 11:45 AM Pooja Andrew PTA [facility]   6/9/2021 11:00 AM Pooja Andrew PTA [facility]   6/11/2021 11:45 AM Evone Neer [facility]   6/14/2021 11:45 AM Evone Neer [facility]   6/16/2021 11:45 AM Pooja Andrew PTA [facility]   6/18/2021  8:45 AM Evone Neer [facility]   6/21/2021 11:45 AM Evone Neer [facility]   6/23/2021 11:45 AM Pooja Andrew PTA [facility]   6/25/2021 11:45 AM Evone Neer [facility]   6/28/2021 11:45 AM Evone Neer [facility]   6/30/2021 11:45 AM Ame Kevin PTA [facility]

## 2021-06-04 ENCOUNTER — HOSPITAL ENCOUNTER (OUTPATIENT)
Dept: PHYSICAL THERAPY | Age: 26
Discharge: HOME | End: 2021-06-04
Payer: MEDICAID

## 2021-06-04 PROCEDURE — 97112 NEUROMUSCULAR REEDUCATION: CPT

## 2021-06-04 PROCEDURE — 97110 THERAPEUTIC EXERCISES: CPT

## 2021-06-04 PROCEDURE — 97116 GAIT TRAINING THERAPY: CPT

## 2021-06-04 PROCEDURE — 97530 THERAPEUTIC ACTIVITIES: CPT
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
## 2021-06-07 ENCOUNTER — APPOINTMENT (OUTPATIENT)
Dept: PHYSICAL THERAPY | Age: 26
End: 2021-06-07
Payer: MEDICAID

## 2021-06-08 ENCOUNTER — HOSPITAL ENCOUNTER (OUTPATIENT)
Dept: PHYSICAL THERAPY | Age: 26
Discharge: HOME | End: 2021-06-08
Payer: MEDICAID

## 2021-06-08 PROCEDURE — 97530 THERAPEUTIC ACTIVITIES: CPT

## 2021-06-08 PROCEDURE — 97116 GAIT TRAINING THERAPY: CPT

## 2021-06-08 PROCEDURE — 97112 NEUROMUSCULAR REEDUCATION: CPT

## 2021-06-08 PROCEDURE — 97110 THERAPEUTIC EXERCISES: CPT
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
## 2021-06-09 ENCOUNTER — HOSPITAL ENCOUNTER (OUTPATIENT)
Dept: PHYSICAL THERAPY | Age: 26
Discharge: HOME | End: 2021-06-09
Payer: MEDICAID

## 2021-06-09 PROCEDURE — 97530 THERAPEUTIC ACTIVITIES: CPT

## 2021-06-09 PROCEDURE — 97110 THERAPEUTIC EXERCISES: CPT

## 2021-06-09 PROCEDURE — 97116 GAIT TRAINING THERAPY: CPT

## 2021-06-09 PROCEDURE — 97112 NEUROMUSCULAR REEDUCATION: CPT
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
## 2021-06-11 ENCOUNTER — HOSPITAL ENCOUNTER (OUTPATIENT)
Dept: PHYSICAL THERAPY | Age: 26
End: 2021-06-11
Payer: MEDICAID

## 2021-06-14 ENCOUNTER — HOSPITAL ENCOUNTER (OUTPATIENT)
Dept: PHYSICAL THERAPY | Age: 26
Discharge: HOME | End: 2021-06-14
Payer: MEDICAID

## 2021-06-14 PROCEDURE — 97530 THERAPEUTIC ACTIVITIES: CPT

## 2021-06-14 PROCEDURE — 97112 NEUROMUSCULAR REEDUCATION: CPT

## 2021-06-14 PROCEDURE — 97110 THERAPEUTIC EXERCISES: CPT
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
## 2021-06-16 ENCOUNTER — HOSPITAL ENCOUNTER (OUTPATIENT)
Dept: PHYSICAL THERAPY | Age: 26
Discharge: HOME | End: 2021-06-16
Payer: MEDICAID

## 2021-06-16 PROCEDURE — 97530 THERAPEUTIC ACTIVITIES: CPT

## 2021-06-16 PROCEDURE — 97112 NEUROMUSCULAR REEDUCATION: CPT

## 2021-06-16 PROCEDURE — 97110 THERAPEUTIC EXERCISES: CPT

## 2021-06-16 PROCEDURE — 97116 GAIT TRAINING THERAPY: CPT
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
## 2021-06-18 ENCOUNTER — HOSPITAL ENCOUNTER (OUTPATIENT)
Dept: PHYSICAL THERAPY | Age: 26
Discharge: HOME | End: 2021-06-18
Payer: MEDICAID

## 2021-06-18 PROCEDURE — 97110 THERAPEUTIC EXERCISES: CPT

## 2021-06-18 PROCEDURE — 97530 THERAPEUTIC ACTIVITIES: CPT

## 2021-06-18 PROCEDURE — 97112 NEUROMUSCULAR REEDUCATION: CPT
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
## 2021-06-21 ENCOUNTER — HOSPITAL ENCOUNTER (OUTPATIENT)
Dept: PHYSICAL THERAPY | Age: 26
End: 2021-06-21
Payer: MEDICAID

## 2021-06-23 ENCOUNTER — APPOINTMENT (OUTPATIENT)
Dept: PHYSICAL THERAPY | Age: 26
End: 2021-06-23
Payer: MEDICAID

## 2021-06-25 ENCOUNTER — HOSPITAL ENCOUNTER (OUTPATIENT)
Dept: PHYSICAL THERAPY | Age: 26
Discharge: HOME | End: 2021-06-25
Payer: MEDICAID

## 2021-06-25 PROCEDURE — 97530 THERAPEUTIC ACTIVITIES: CPT

## 2021-06-25 PROCEDURE — 97110 THERAPEUTIC EXERCISES: CPT

## 2021-06-25 PROCEDURE — 97112 NEUROMUSCULAR REEDUCATION: CPT
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
## 2021-06-28 ENCOUNTER — HOSPITAL ENCOUNTER (OUTPATIENT)
Dept: PHYSICAL THERAPY | Age: 26
Discharge: HOME | End: 2021-06-28
Payer: MEDICAID

## 2021-06-28 PROCEDURE — 97110 THERAPEUTIC EXERCISES: CPT

## 2021-06-28 PROCEDURE — 97112 NEUROMUSCULAR REEDUCATION: CPT

## 2021-06-28 PROCEDURE — 97530 THERAPEUTIC ACTIVITIES: CPT
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
## 2021-06-30 ENCOUNTER — HOSPITAL ENCOUNTER (OUTPATIENT)
Dept: PHYSICAL THERAPY | Age: 26
Discharge: HOME | End: 2021-06-30
Payer: MEDICAID

## 2021-06-30 PROCEDURE — 97116 GAIT TRAINING THERAPY: CPT

## 2021-06-30 PROCEDURE — 97530 THERAPEUTIC ACTIVITIES: CPT

## 2021-06-30 PROCEDURE — 97112 NEUROMUSCULAR REEDUCATION: CPT

## 2021-06-30 PROCEDURE — 97110 THERAPEUTIC EXERCISES: CPT
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
